# Patient Record
Sex: MALE | Race: WHITE | NOT HISPANIC OR LATINO | Employment: OTHER | URBAN - METROPOLITAN AREA
[De-identification: names, ages, dates, MRNs, and addresses within clinical notes are randomized per-mention and may not be internally consistent; named-entity substitution may affect disease eponyms.]

---

## 2017-02-01 NOTE — TELEPHONE ENCOUNTER
----- Message from Sandhya Valencia sent at 2/1/2017 10:04 AM EST -----  Contact: [phone]  PT SAID HIS INSURANCE NEEDS US TO REQUEST HIS NEXIUM. PLEASE CALL  Patient has tried Pantoprazole,Zantac Pepcid AC // didn't help and otc Nexium used 2 pills with relief so RX strength needed

## 2017-07-31 ENCOUNTER — TELEPHONE (OUTPATIENT)
Dept: FAMILY MEDICINE CLINIC | Facility: CLINIC | Age: 62
End: 2017-07-31

## 2017-07-31 ENCOUNTER — OFFICE VISIT (OUTPATIENT)
Dept: FAMILY MEDICINE CLINIC | Facility: CLINIC | Age: 62
End: 2017-07-31

## 2017-07-31 VITALS
TEMPERATURE: 98.5 F | DIASTOLIC BLOOD PRESSURE: 70 MMHG | WEIGHT: 228.4 LBS | HEIGHT: 75 IN | SYSTOLIC BLOOD PRESSURE: 130 MMHG | OXYGEN SATURATION: 94 % | RESPIRATION RATE: 16 BRPM | BODY MASS INDEX: 28.4 KG/M2 | HEART RATE: 69 BPM

## 2017-07-31 DIAGNOSIS — J06.9 ACUTE URI: Primary | ICD-10-CM

## 2017-07-31 DIAGNOSIS — Z12.11 ENCOUNTER FOR SCREENING COLONOSCOPY: ICD-10-CM

## 2017-07-31 DIAGNOSIS — J40 BRONCHITIS: ICD-10-CM

## 2017-07-31 PROCEDURE — 99213 OFFICE O/P EST LOW 20 MIN: CPT | Performed by: NURSE PRACTITIONER

## 2017-07-31 RX ORDER — ALBUTEROL SULFATE 90 UG/1
2 AEROSOL, METERED RESPIRATORY (INHALATION) EVERY 4 HOURS PRN
Qty: 1 INHALER | Refills: 1 | COMMUNITY
End: 2017-08-16 | Stop reason: SDUPTHER

## 2017-07-31 RX ORDER — LEVOFLOXACIN 500 MG/1
500 TABLET, FILM COATED ORAL DAILY
Qty: 7 TABLET | Refills: 0 | Status: SHIPPED | OUTPATIENT
Start: 2017-07-31 | End: 2017-08-16

## 2017-07-31 NOTE — PATIENT INSTRUCTIONS
Start levaquin 500mg daily for 7 days, take with food, previously tolerated.  Albuterol inhaler 2 puffs every 4 hours as needed for wheezing or SOA.  May try mucinex DM OTC as needed for cough and congestion.  Sleep propped up at night.   Increase fluid intake, get plenty of rest.   Call office and f/u if no improvement in about 5-7 days.   Patient agrees with plan of care and understands instructions. Call if worsening symptoms or any problems or concerns.

## 2017-07-31 NOTE — PROGRESS NOTES
Subjective   Juventino Rueda is a 62 y.o. male.     History of Present Illness   C/o cough and congestion, he has a productive cough, yellow in color, HA, chills, he has noticed wheezing, he denies fever, symptoms started about 1 week ago, he does not smoke, he does have hx of allergies and asthma, he tried inhalers but do not help much. He does have chest tightness at times, he tried robitussin DM OTC. He also tried evangelista seltzer OTC. He tried whiskey honey and lemon. He is traveling out of town this weekend.   The following portions of the patient's history were reviewed and updated as appropriate: allergies, current medications, past family history, past medical history, past social history, past surgical history and problem list.    Review of Systems   Constitutional: Positive for chills. Negative for diaphoresis and fever.   HENT: Positive for congestion. Negative for ear pain, rhinorrhea, sinus pressure and sore throat.    Eyes: Negative for pain.   Respiratory: Positive for cough, chest tightness and wheezing. Negative for shortness of breath.    Cardiovascular: Negative for chest pain.   Musculoskeletal: Negative for arthralgias and myalgias.   Skin: Negative for pallor.   Allergic/Immunologic: Positive for environmental allergies.   Neurological: Negative for dizziness, light-headedness and headaches.   All other systems reviewed and are negative.      Objective   Physical Exam   Constitutional: He is oriented to person, place, and time. He appears well-developed and well-nourished.   HENT:   Head: Normocephalic.   Right Ear: Tympanic membrane and ear canal normal.   Left Ear: Tympanic membrane and ear canal normal.   Nose: Nose normal.   Mouth/Throat: Uvula is midline, oropharynx is clear and moist and mucous membranes are normal.   Eyes: Pupils are equal, round, and reactive to light.   Neck: Normal range of motion. Neck supple.   Cardiovascular: Normal rate, regular rhythm and normal heart sounds.     Pulmonary/Chest: Effort normal. No respiratory distress. He has no decreased breath sounds. He has no wheezes. He has rhonchi in the right lower field and the left lower field.   Musculoskeletal: Normal range of motion.   Lymphadenopathy:     He has no cervical adenopathy.   Neurological: He is alert and oriented to person, place, and time.   Skin: Skin is warm and dry.   Psychiatric: He has a normal mood and affect. His behavior is normal. Judgment and thought content normal.   Nursing note and vitals reviewed.      Assessment/Plan   Juventino was seen today for uri.    Diagnoses and all orders for this visit:    Acute URI    Bronchitis    Encounter for screening colonoscopy  -     Ambulatory Referral For Screening Colonoscopy    Other orders  -     levoFLOXacin (LEVAQUIN) 500 MG tablet; Take 1 tablet by mouth Daily. 1 tab qd x 10d          Start levaquin 500mg daily for 7 days, take with food, previously tolerated.  Albuterol inhaler 2 puffs every 4 hours as needed for wheezing or SOA.  May try mucinex DM OTC as needed for cough and congestion.  Sleep propped up at night.   Increase fluid intake, get plenty of rest.   Call office and f/u if no improvement in about 5-7 days.   Patient agrees with plan of care and understands instructions. Call if worsening symptoms or any problems or concerns.

## 2017-08-16 ENCOUNTER — TELEPHONE (OUTPATIENT)
Dept: FAMILY MEDICINE CLINIC | Facility: CLINIC | Age: 62
End: 2017-08-16

## 2017-08-16 ENCOUNTER — OFFICE VISIT (OUTPATIENT)
Dept: FAMILY MEDICINE CLINIC | Facility: CLINIC | Age: 62
End: 2017-08-16

## 2017-08-16 VITALS
TEMPERATURE: 98.5 F | HEART RATE: 77 BPM | OXYGEN SATURATION: 91 % | WEIGHT: 231.8 LBS | HEIGHT: 75 IN | DIASTOLIC BLOOD PRESSURE: 70 MMHG | RESPIRATION RATE: 16 BRPM | SYSTOLIC BLOOD PRESSURE: 130 MMHG | BODY MASS INDEX: 28.82 KG/M2

## 2017-08-16 DIAGNOSIS — M81.0 OSTEOPOROSIS: ICD-10-CM

## 2017-08-16 DIAGNOSIS — D64.9 LOW HEMOGLOBIN: ICD-10-CM

## 2017-08-16 DIAGNOSIS — N52.9 ERECTILE DYSFUNCTION, UNSPECIFIED ERECTILE DYSFUNCTION TYPE: ICD-10-CM

## 2017-08-16 DIAGNOSIS — R73.9 ELEVATED BLOOD SUGAR: ICD-10-CM

## 2017-08-16 DIAGNOSIS — Z87.39 HISTORY OF OSTEOPOROSIS: ICD-10-CM

## 2017-08-16 DIAGNOSIS — R93.89 ABNORMAL CHEST X-RAY: ICD-10-CM

## 2017-08-16 DIAGNOSIS — R73.9 ELEVATED BLOOD SUGAR: Primary | ICD-10-CM

## 2017-08-16 DIAGNOSIS — R05.3 PERSISTENT COUGH FOR 3 WEEKS OR LONGER: Primary | ICD-10-CM

## 2017-08-16 DIAGNOSIS — J98.6 PARALYSIS OF DIAPHRAGM: ICD-10-CM

## 2017-08-16 DIAGNOSIS — Z00.00 MEDICARE ANNUAL WELLNESS VISIT, INITIAL: ICD-10-CM

## 2017-08-16 LAB
ALBUMIN SERPL-MCNC: 4.2 G/DL (ref 3.5–5.2)
ALBUMIN/GLOB SERPL: 1.4 G/DL
ALP SERPL-CCNC: 79 U/L (ref 39–117)
ALT SERPL W P-5'-P-CCNC: 19 U/L (ref 1–41)
ANION GAP SERPL CALCULATED.3IONS-SCNC: 9.7 MMOL/L
AST SERPL-CCNC: 20 U/L (ref 1–40)
BILIRUB SERPL-MCNC: 0.5 MG/DL (ref 0.1–1.2)
BUN BLD-MCNC: 9 MG/DL (ref 8–23)
BUN/CREAT SERPL: 13.8 (ref 7–25)
CALCIUM SPEC-SCNC: 9.6 MG/DL (ref 8.6–10.5)
CHLORIDE SERPL-SCNC: 98 MMOL/L (ref 98–107)
CO2 SERPL-SCNC: 33.3 MMOL/L (ref 22–29)
CREAT BLD-MCNC: 0.65 MG/DL (ref 0.76–1.27)
ERYTHROCYTE [DISTWIDTH] IN BLOOD BY AUTOMATED COUNT: 13.3 % (ref 4.5–15)
GFR SERPL CREATININE-BSD FRML MDRD: 125 ML/MIN/1.73
GLOBULIN UR ELPH-MCNC: 3.1 GM/DL
GLUCOSE BLD-MCNC: 158 MG/DL (ref 65–99)
HBA1C MFR BLD: 7.1 % (ref 4.8–5.6)
HCT VFR BLD AUTO: 39.9 % (ref 35–60)
HGB BLD-MCNC: 12.3 G/DL (ref 13.5–18)
LYMPHOCYTES # BLD AUTO: 1.2 10*3/MM3 (ref 1.2–3.4)
LYMPHOCYTES NFR BLD AUTO: 34.5 % (ref 21–51)
MCH RBC QN AUTO: 28.2 PG (ref 26.1–33.1)
MCHC RBC AUTO-ENTMCNC: 30.9 G/DL (ref 33–37)
MCV RBC AUTO: 91.5 FL (ref 80–99)
MONOCYTES # BLD AUTO: 0.3 10*3/MM3 (ref 0.1–0.6)
MONOCYTES NFR BLD AUTO: 8.2 % (ref 2–9)
NEUTROPHILS # BLD AUTO: 1.9 10*3/MM3 (ref 1.4–6.5)
NEUTROPHILS NFR BLD AUTO: 57.3 % (ref 42–75)
PLATELET # BLD AUTO: 172 10*3/MM3 (ref 150–450)
PMV BLD AUTO: 6.4 FL (ref 7.1–10.5)
POTASSIUM BLD-SCNC: 4.9 MMOL/L (ref 3.5–5.2)
PROT SERPL-MCNC: 7.3 G/DL (ref 6–8.5)
RBC # BLD AUTO: 4.36 10*6/MM3 (ref 4–6)
SODIUM BLD-SCNC: 141 MMOL/L (ref 136–145)
WBC NRBC COR # BLD: 3.4 10*3/MM3 (ref 4.5–10)

## 2017-08-16 PROCEDURE — 36415 COLL VENOUS BLD VENIPUNCTURE: CPT | Performed by: NURSE PRACTITIONER

## 2017-08-16 PROCEDURE — 83036 HEMOGLOBIN GLYCOSYLATED A1C: CPT | Performed by: NURSE PRACTITIONER

## 2017-08-16 PROCEDURE — 99214 OFFICE O/P EST MOD 30 MIN: CPT | Performed by: NURSE PRACTITIONER

## 2017-08-16 PROCEDURE — G0438 PPPS, INITIAL VISIT: HCPCS | Performed by: NURSE PRACTITIONER

## 2017-08-16 PROCEDURE — 80053 COMPREHEN METABOLIC PANEL: CPT | Performed by: NURSE PRACTITIONER

## 2017-08-16 PROCEDURE — 85025 COMPLETE CBC W/AUTO DIFF WBC: CPT | Performed by: NURSE PRACTITIONER

## 2017-08-16 PROCEDURE — 71020 XR CHEST 2 VW: CPT | Performed by: NURSE PRACTITIONER

## 2017-08-16 RX ORDER — ALBUTEROL SULFATE 90 UG/1
2 AEROSOL, METERED RESPIRATORY (INHALATION) EVERY 4 HOURS PRN
Qty: 1 INHALER | Refills: 1 | Status: SHIPPED | OUTPATIENT
Start: 2017-08-16 | End: 2017-10-24 | Stop reason: SDUPTHER

## 2017-08-16 RX ORDER — CEFUROXIME AXETIL 250 MG/1
250 TABLET ORAL 2 TIMES DAILY
Qty: 20 TABLET | Refills: 0 | Status: SHIPPED | OUTPATIENT
Start: 2017-08-16 | End: 2017-10-11

## 2017-08-16 RX ORDER — CEFUROXIME AXETIL 250 MG/1
250 TABLET ORAL 2 TIMES DAILY
Qty: 20 TABLET | Refills: 0 | COMMUNITY
End: 2017-08-16 | Stop reason: SDUPTHER

## 2017-08-16 NOTE — TELEPHONE ENCOUNTER
----- Message from CITLALLI Thomas sent at 8/16/2017  4:02 PM EDT -----  Please call patient with results. bs slightly elevated, will order hemoglobin a1c, kidney func slightly decreased, increase water intake to 8 glasses per day.    Pt informed of lab results

## 2017-08-16 NOTE — PATIENT INSTRUCTIONS
Start ceftin 250mg 2x day for 10 days, take with food, previously tolerated.  CBC,CMP today, will call with results.   Heme occult card, patient to return to office.   Albuterol inhaler 2 puffs every 4 hours as needed for wheezing or SOA, previously tolerated.  cxr today, will call with results.  dexa scan, will call with appt.  Chest ct will call with appt.  Discussed plan of care with Dr. Salamanca.  Increase fluid intake, get plenty of rest.   Patient agrees with plan of care and understands instructions. Call if worsening symptoms or any problems or concerns.     Osteoporosis  Osteoporosis is the thinning and loss of density in the bones. Osteoporosis makes the bones more brittle, fragile, and likely to break (fracture). Over time, osteoporosis can cause the bones to become so weak that they fracture after a simple fall. The bones most likely to fracture are the bones in the hip, wrist, and spine.  CAUSES   The exact cause is not known.  RISK FACTORS  Anyone can develop osteoporosis. You may be at greater risk if you have a family history of the condition or have poor nutrition. You may also have a higher risk if you are:   · Female.    · 50 years old or older.  · A smoker.  · Not physically active.    · White or .  · Slender.  SIGNS AND SYMPTOMS   A fracture might be the first sign of the disease, especially if it results from a fall or injury that would not usually cause a bone to break. Other signs and symptoms include:   · Low back and neck pain.  · Stooped posture.  · Height loss.  DIAGNOSIS   To make a diagnosis, your health care provider may:  · Take a medical history.  · Perform a physical exam.  · Order tests, such as:    A bone mineral density test.    A dual-energy X-ray absorptiometry test.  TREATMENT   The goal of osteoporosis treatment is to strengthen your bones to reduce your risk of a fracture. Treatment may involve:  · Making lifestyle changes, such as:    Eating a diet rich in calcium.     Doing weight-bearing and muscle-strengthening exercises.    Stopping tobacco use.    Limiting alcohol intake.  · Taking medicine to slow the process of bone loss or to increase bone density.  · Monitoring your levels of calcium and vitamin D.  HOME CARE INSTRUCTIONS  · Include calcium and vitamin D in your diet. Calcium is important for bone health, and vitamin D helps the body absorb calcium.  · Perform weight-bearing and muscle-strengthening exercises as directed by your health care provider.  · Do not use any tobacco products, including cigarettes, chewing tobacco, and electronic cigarettes. If you need help quitting, ask your health care provider.  · Limit your alcohol intake.  · Take medicines only as directed by your health care provider.  · Keep all follow-up visits as directed by your health care provider. This is important.  · Take precautions at home to lower your risk of falling, such as:    Keeping rooms well lit and clutter free.    Installing safety rails on stairs.    Using rubber mats in the bathroom and other areas that are often wet or slippery.  SEEK IMMEDIATE MEDICAL CARE IF:   You fall or injure yourself.      This information is not intended to replace advice given to you by your health care provider. Make sure you discuss any questions you have with your health care provider.     Document Released: 09/27/2006 Document Revised: 04/10/2017 Document Reviewed: 05/28/2015  [employer] Interactive Patient Education ©2017 [employer]

## 2017-08-16 NOTE — PROGRESS NOTES
Subjective   Juventino Rueda is a 62 y.o. male.     History of Present Illness   C/o cough, congestion, HA, he was seen on 7/31/17, given levaquin, inhalers, he states he got slightly better, but the cough won't go away, he states if he expends any effort he starts coughing, he coughs so much his head hurts, he denies smoking hx, he has a hx of asthma and allergies, he has a hx of left paralyzed diaphragm, diaphragmatic hernia repair as a child, he does not see pulm. He did not get inhaler that was prescribed at last vist, he denies fever. He has had trouble sleeping, he has a productive cough, yellow or clear in color, he has tried mucinex. He has had coke to drink this morning. He sees Dr. Russo, [facility], for leg wound on left leg, he states he does noticed swelling in legs at times. He has taken singular in the past for this. He states he smoke a pipe or cigar once a year when he goes fishing, no other smoking history.   He has a hx of osteoporosis, he has not had a recent done density scan, he was on medication for this, also takes calcium, thinks last dexa about 5 years ago, was on injectable medication until about 2 years ago. He states he had this in multiple areas of body.   He also c/o ED, he has tried cilias and Stendra which help, he states he cannot achieve erection when he tried to have intercourse with his wife, he does not tolerate Viagra, he has not seen urology.     The following portions of the patient's history were reviewed and updated as appropriate: allergies, current medications, past family history, past medical history, past social history, past surgical history and problem list.    Review of Systems   Constitutional: Negative for chills, diaphoresis and fever.   HENT: Positive for congestion. Negative for ear pain, postnasal drip, rhinorrhea, sinus pressure and sore throat.    Eyes: Negative for pain.   Respiratory: Positive for cough, shortness of breath and wheezing. Negative for chest  tightness.    Cardiovascular: Negative for chest pain, palpitations and leg swelling.   Musculoskeletal: Negative for arthralgias and myalgias.   Skin: Negative for pallor.   Allergic/Immunologic: Positive for environmental allergies.   Neurological: Positive for headaches. Negative for dizziness and light-headedness.   All other systems reviewed and are negative.      Objective   Physical Exam   Constitutional: He is oriented to person, place, and time. He appears well-developed and well-nourished.   HENT:   Head: Normocephalic.   Eyes: Pupils are equal, round, and reactive to light.   Neck: Normal range of motion. Neck supple.   Cardiovascular: Normal rate, regular rhythm and normal heart sounds.    Pulses:       Radial pulses are 2+ on the right side, and 2+ on the left side.        Dorsalis pedis pulses are 2+ on the right side, and 2+ on the left side.        Posterior tibial pulses are 2+ on the right side, and 2+ on the left side.   Pulmonary/Chest: Effort normal. No respiratory distress. He has decreased breath sounds in the left upper field and the left lower field. He has no wheezes. He has no rales. He exhibits no tenderness.   Musculoskeletal: Normal range of motion.   Lymphadenopathy:     He has no cervical adenopathy.   Neurological: He is alert and oriented to person, place, and time.   Skin: Skin is warm and dry.   Psychiatric: He has a normal mood and affect. His behavior is normal. Judgment and thought content normal.   Nursing note and vitals reviewed.  cxr 2v in office for persistent cough, no comparison noted, showed elevated left hemidiaphragm, awaiting radiology over read.     Assessment/Plan   Juventino was seen today for follow-up.    Diagnoses and all orders for this visit:    Persistent cough for 3 weeks or longer  -     XR Chest 2 View  -     CBC & Differential  -     Comprehensive Metabolic Panel  -     CBC Auto Differential  -     CT Chest With & Without Contrast; Future    Low  hemoglobin  -     Occult Blood, Fecal By Immunoassay; Future    History of osteoporosis  -     DEXA Bone Density Axial; Future    Abnormal chest x-ray  -     CT Chest With & Without Contrast; Future    Paralysis of diaphragm  -     CT Chest With & Without Contrast; Future    Osteoporosis   -     DEXA Bone Density Axial; Future    Medicare annual wellness visit, initial    Erectile dysfunction, unspecified erectile dysfunction type  -     Ambulatory Referral to Urology    Other orders  -     albuterol (PROVENTIL HFA;VENTOLIN HFA) 108 (90 Base) MCG/ACT inhaler; Inhale 2 puffs Every 4 (Four) Hours As Needed for Wheezing or Shortness of Air.      Start ceftin 250mg 2x day for 10 days, take with food, previously tolerated.  CBC,CMP today, will call with results.   Heme occult card, patient to return to office.   Albuterol inhaler 2 puffs every 4 hours as needed for wheezing or SOA, previously tolerated.  cxr today, will call with results.  dexa scan, will call with appt.  Chest ct will call with appt. Pending results will consider pulm refer.   Sample of stendra given to patient, refer to urology, will call with appt.   Discussed plan of care with Dr. Salamanca.  Increase fluid intake, get plenty of rest.   Patient agrees with plan of care and understands instructions. Call if worsening symptoms or any problems or concerns.

## 2017-08-16 NOTE — PROGRESS NOTES
QUICK REFERENCE INFORMATION:  The ABCs of the Annual Wellness Visit    Initial Medicare Wellness Visit    HEALTH RISK ASSESSMENT    1955    Recent Hospitalizations:  No hospitalization(s) within the last year..        Current Medical Providers:  Patient Care Team:  Geoff Salamanca MD as PCP - General  R Quan Smith MD as Surgeon (Orthopedic Surgery)  Bam Lopez MD as Consulting Physician (Pain Medicine)  Aly Ortiz MD as Consulting Physician (Gastroenterology)        Smoking Status:  History   Smoking Status   • Never Smoker   Smokeless Tobacco   • Never Used       Alcohol Consumption:  History   Alcohol Use   • Yes     Comment: occas       Depression Screen:   PHQ-9 Depression Screening 8/16/2017   Little interest or pleasure in doing things 0   Feeling down, depressed, or hopeless 0   Trouble falling or staying asleep, or sleeping too much 0   Feeling tired or having little energy 1   Poor appetite or overeating 1   Feeling bad about yourself - or that you are a failure or have let yourself or your family down 0   Trouble concentrating on things, such as reading the newspaper or watching television 0   Moving or speaking so slowly that other people could have noticed. Or the opposite - being so fidgety or restless that you have been moving around a lot more than usual 0   Thoughts that you would be better off dead, or of hurting yourself in some way 0   PHQ-9 Total Score 2   If you checked off any problems, how difficult have these problems made it for you to do your work, take care of things at home, or get along with other people? Not difficult at all       Health Habits and Functional and Cognitive Screening:  Functional & Cognitive Status 8/16/2017   Do you have difficulty preparing food and eating? No   Do you have difficulty bathing yourself? No   Do you have difficulty getting dressed? No   Do you have difficulty using the toilet? No   Do you have difficulty moving around from place to  place? No   In the past year have you fallen or experienced a near fall? Yes   Do you need help using the phone?  No   Are you deaf or do you have serious difficulty hearing?  No   Do you need help with transportation? No   Do you need help shopping? No   Do you need help preparing meals?  No   Do you need help with housework?  No   Do you need help with laundry? No   Do you need help taking your medications? No   Do you need help managing money? No   Do you have difficulty concentrating, remembering or making decisions? No       Health Habits  Current Diet: Limited Junk Food  Dental Exam: Not up to date  Eye Exam: Not up to date  Exercise (times per week): 4 times per week  Current Exercise Activities Include: Yard Work          Does the patient have evidence of cognitive impairment? No    Asiprin use counseling: Does not need ASA (and currently is not on it)      Recent Lab Results:    Visual Acuity:  No exam data present    Age-appropriate Screening Schedule:  Refer to the list below for future screening recommendations based on patient's age, sex and/or medical conditions. Orders for these recommended tests are listed in the plan section. The patient has been provided with a written plan.    Health Maintenance   Topic Date Due   • TDAP/TD VACCINES (1 - Tdap) 04/11/1975   • COLONOSCOPY  04/05/2016   • ZOSTER VACCINE  04/11/2016   • DXA SCAN  08/16/2017   • INFLUENZA VACCINE  09/01/2017        Subjective   History of Present Illness    Juventino Rueda is a 62 y.o. male who presents for an Annual Wellness Visit.    The following portions of the patient's history were reviewed and updated as appropriate: allergies, current medications, past family history, past medical history, past social history, past surgical history and problem list.    Outpatient Medications Prior to Visit   Medication Sig Dispense Refill   • baclofen (LIORESAL) 10 MG tablet Take 10 mg by mouth 3 (three) times a day.     • calcium carbonate  "(OS-JOE) 600 MG tablet Take 600 mg by mouth daily.     • gabapentin (NEURONTIN) 300 MG capsule Take 300 mg by mouth 2 (two) times a day.     • HYDROcodone-acetaminophen (NORCO)  MG per tablet      • NEXIUM 40 MG capsule TAKE ONE CAPSULE BY MOUTH IN THE MORNING BEFORE  BREAKFAST 30 capsule 0   • pantoprazole (PROTONIX) 40 MG EC tablet TAKE ONE TABLET BY MOUTH ONCE DAILY 30 tablet 0   • VOLTAREN 1 % gel gel 3 (three) times a day as needed.     • albuterol (PROVENTIL HFA;VENTOLIN HFA) 108 (90 BASE) MCG/ACT inhaler Inhale 2 puffs Every 4 (Four) Hours As Needed for Wheezing. 1 inhaler 1   • levoFLOXacin (LEVAQUIN) 500 MG tablet Take 1 tablet by mouth Daily. 1 tab qd x 10d 7 tablet 0     No facility-administered medications prior to visit.        Patient Active Problem List   Diagnosis   • GERD (gastroesophageal reflux disease)   • Arthritis       Advance Care Planning:  has NO advance directive - information provided to the patient today    Identification of Risk Factors:  Risk factors include: weight , unhealthy diet and inactivity.    Review of Systems    Compared to one year ago, the patient feels his physical health is the same.  Compared to one year ago, the patient feels his mental health is the same.    Objective     Physical Exam    Vitals:    08/16/17 0936   BP: 130/70   BP Location: Left arm   Patient Position: Sitting   Cuff Size: Adult   Pulse: 77   Resp: 16   Temp: 98.5 °F (36.9 °C)   TempSrc: Oral   SpO2: 91%   Weight: 231 lb 12.8 oz (105 kg)   Height: 75\" (190.5 cm)   PainSc: 0-No pain       Body mass index is 28.97 kg/(m^2).  Discussed the patient's BMI with him. The BMI is in the acceptable range.    Assessment/Plan   Patient Self-Management and Personalized Health Advice  The patient has been provided with information about: diet, exercise and weight management and preventive services including:   · Advance directive, Bone densitometry screening.    Visit Diagnoses:    ICD-10-CM ICD-9-CM   1. " Persistent cough for 3 weeks or longer R05 786.2   2. Low hemoglobin D64.9 285.9   3. History of osteoporosis Z87.39 V13.59   4. Abnormal chest x-ray R93.8 793.2   5. Paralysis of diaphragm J98.6 519.4   6. Osteoporosis  M81.0 733.00       Orders Placed This Encounter   Procedures   • XR Chest 2 View     Order Specific Question:   Reason for Exam:     Answer:   persistent cough   • DEXA Bone Density Axial     Standing Status:   Future     Standing Expiration Date:   8/16/2018     Order Specific Question:   Reason for Exam:     Answer:   history of osteoporosis.   • CT Chest With & Without Contrast     Standing Status:   Future     Standing Expiration Date:   8/16/2018     Order Specific Question:   Reason for Exam:     Answer:   abnormal cxr, history of paralysis of diaphragm, left.   • Comprehensive Metabolic Panel   • CBC Auto Differential   • Occult Blood, Fecal By Immunoassay     Standing Status:   Future     Standing Expiration Date:   8/16/2018   • CBC & Differential     Order Specific Question:   Manual Differential     Answer:   No       Outpatient Encounter Prescriptions as of 8/16/2017   Medication Sig Dispense Refill   • albuterol (PROVENTIL HFA;VENTOLIN HFA) 108 (90 Base) MCG/ACT inhaler Inhale 2 puffs Every 4 (Four) Hours As Needed for Wheezing or Shortness of Air. 1 inhaler 1   • baclofen (LIORESAL) 10 MG tablet Take 10 mg by mouth 3 (three) times a day.     • calcium carbonate (OS-JOE) 600 MG tablet Take 600 mg by mouth daily.     • cefuroxime (CEFTIN) 250 MG tablet Take 1 tablet by mouth 2 (Two) Times a Day. X 10 d 20 tablet 0   • gabapentin (NEURONTIN) 300 MG capsule Take 300 mg by mouth 2 (two) times a day.     • HYDROcodone-acetaminophen (NORCO)  MG per tablet      • NEXIUM 40 MG capsule TAKE ONE CAPSULE BY MOUTH IN THE MORNING BEFORE  BREAKFAST 30 capsule 0   • pantoprazole (PROTONIX) 40 MG EC tablet TAKE ONE TABLET BY MOUTH ONCE DAILY 30 tablet 0   • VOLTAREN 1 % gel gel 3 (three) times a  day as needed.     • [DISCONTINUED] albuterol (PROVENTIL HFA;VENTOLIN HFA) 108 (90 BASE) MCG/ACT inhaler Inhale 2 puffs Every 4 (Four) Hours As Needed for Wheezing. 1 inhaler 1   • [DISCONTINUED] levoFLOXacin (LEVAQUIN) 500 MG tablet Take 1 tablet by mouth Daily. 1 tab qd x 10d 7 tablet 0     No facility-administered encounter medications on file as of 8/16/2017.        Reviewed use of high risk medication in the elderly: not applicable  Reviewed for potential of harmful drug interactions in the elderly: not applicable    Follow Up:  Return if symptoms worsen or fail to improve.     An After Visit Summary and PPPS with all of these plans were given to the patient.

## 2017-08-17 ENCOUNTER — TELEPHONE (OUTPATIENT)
Dept: FAMILY MEDICINE CLINIC | Facility: CLINIC | Age: 62
End: 2017-08-17

## 2017-08-17 DIAGNOSIS — R73.09 ELEVATED HEMOGLOBIN A1C: Primary | ICD-10-CM

## 2017-08-17 DIAGNOSIS — R79.89 ABNORMAL CBC: ICD-10-CM

## 2017-08-17 DIAGNOSIS — E11.8 TYPE 2 DIABETES MELLITUS WITH COMPLICATION, WITHOUT LONG-TERM CURRENT USE OF INSULIN (HCC): Primary | ICD-10-CM

## 2017-08-17 RX ORDER — LANCING DEVICE/LANCETS
1 KIT MISCELLANEOUS 4 TIMES DAILY
Qty: 1 KIT | Refills: 1 | Status: SHIPPED | OUTPATIENT
Start: 2017-08-17

## 2017-08-17 RX ORDER — BLOOD-GLUCOSE METER
1 EACH MISCELLANEOUS 4 TIMES DAILY
Qty: 1 KIT | Refills: 1 | Status: SHIPPED | OUTPATIENT
Start: 2017-08-17

## 2017-08-17 NOTE — TELEPHONE ENCOUNTER
----- Message from CITLALLI Thomas sent at 8/17/2017 12:51 PM EDT -----  Please call patient with results. His A1C is elevated which suggests diabetes. We will start medication for this as well as diet and exercise. He should begin metformin 500mg 2x day, I will also call in supplies for him to check BS at home. He needs to f/u next week for diabetes counseling and additional lab work. cxr results still pending.    Pt informed of lab results

## 2017-08-18 ENCOUNTER — TELEPHONE (OUTPATIENT)
Dept: FAMILY MEDICINE CLINIC | Facility: CLINIC | Age: 62
End: 2017-08-18

## 2017-08-18 DIAGNOSIS — E11.9 TYPE 2 DIABETES MELLITUS WITHOUT COMPLICATION, WITHOUT LONG-TERM CURRENT USE OF INSULIN (HCC): Primary | ICD-10-CM

## 2017-08-22 ENCOUNTER — TELEPHONE (OUTPATIENT)
Dept: FAMILY MEDICINE CLINIC | Facility: CLINIC | Age: 62
End: 2017-08-22

## 2017-08-22 NOTE — TELEPHONE ENCOUNTER
PT HAS A DEXA SCHEDULED FOR THE 26 BUT THE ORDER IS INVALID BECAUSE IT SAYS HE HAS A HX OF OSTEOPOROSIS AND WHEN SCHEDULING WENT TO LOOK FOR NOTES THEY COULDN'T FIND ANYTHING STATING THE PT HAS EVER HAD A DEXA SCAN BEFORE OR THAT THEY HAVE A HX OF OSTEOPOROSIS,THEY NEED A NEW ORDER     [phone]

## 2017-08-23 ENCOUNTER — HOSPITAL ENCOUNTER (OUTPATIENT)
Dept: CT IMAGING | Facility: HOSPITAL | Age: 62
Discharge: HOME | End: 2017-08-23

## 2017-08-23 ENCOUNTER — APPOINTMENT (OUTPATIENT)
Dept: BONE DENSITY | Facility: HOSPITAL | Age: 62
End: 2017-08-23

## 2017-09-05 ENCOUNTER — HOSPITAL ENCOUNTER (OUTPATIENT)
Dept: CT IMAGING | Facility: HOSPITAL | Age: 62
Discharge: HOME | End: 2017-09-05

## 2017-09-05 ENCOUNTER — HOSPITAL ENCOUNTER (OUTPATIENT)
Dept: BONE DENSITY | Facility: HOSPITAL | Age: 62
Discharge: HOME | End: 2017-09-05
Admitting: NURSE PRACTITIONER

## 2017-09-05 DIAGNOSIS — J98.6 PARALYSIS OF DIAPHRAGM: ICD-10-CM

## 2017-09-05 DIAGNOSIS — R93.89 ABNORMAL CHEST X-RAY: ICD-10-CM

## 2017-09-05 DIAGNOSIS — R05.3 PERSISTENT COUGH FOR 3 WEEKS OR LONGER: ICD-10-CM

## 2017-09-05 DIAGNOSIS — M81.0 OSTEOPOROSIS: ICD-10-CM

## 2017-09-05 DIAGNOSIS — Z87.39 HISTORY OF OSTEOPOROSIS: ICD-10-CM

## 2017-09-05 LAB — CREAT BLDA-MCNC: 0.8 MG/DL (ref 0.6–1.3)

## 2017-09-05 PROCEDURE — 82565 ASSAY OF CREATININE: CPT

## 2017-09-05 PROCEDURE — 71260 CT THORAX DX C+: CPT

## 2017-09-05 PROCEDURE — 0 IOPAMIDOL 61 % SOLUTION: Performed by: NURSE PRACTITIONER

## 2017-09-05 PROCEDURE — 77080 DXA BONE DENSITY AXIAL: CPT

## 2017-09-05 RX ADMIN — IOPAMIDOL 75 ML: 612 INJECTION, SOLUTION INTRAVENOUS at 14:21

## 2017-09-11 ENCOUNTER — DOCUMENTATION (OUTPATIENT)
Dept: FAMILY MEDICINE CLINIC | Facility: CLINIC | Age: 62
End: 2017-09-11

## 2017-09-11 DIAGNOSIS — Z12.11 SCREEN FOR COLON CANCER: ICD-10-CM

## 2017-09-11 DIAGNOSIS — K76.0 FATTY LIVER: ICD-10-CM

## 2017-09-11 DIAGNOSIS — R93.89 ABNORMAL CT SCAN: ICD-10-CM

## 2017-09-11 DIAGNOSIS — N28.89 RENAL MASS, LEFT: ICD-10-CM

## 2017-09-11 DIAGNOSIS — M85.80 OSTEOPENIA: Primary | ICD-10-CM

## 2017-09-11 RX ORDER — ALENDRONATE SODIUM 70 MG/1
TABLET ORAL
Qty: 4 TABLET | Refills: 11 | Status: SHIPPED | OUTPATIENT
Start: 2017-09-11

## 2017-09-11 NOTE — PROGRESS NOTES
Patient called with dexa and ct results. Informed of osteopenia, will restart fosamax, call with problems, recheck dexa in 1 year. Will refer to GI for colonoscopy and fatty liver. Ct chest WNL. Incidentally found renal mass, scheduled for f/u with urology for ED on 9/25/17 with Dr. Keegan Espinosa. Will schedule f/u for renal mass at that time and fax CT results.

## 2017-10-11 ENCOUNTER — OFFICE VISIT (OUTPATIENT)
Dept: FAMILY MEDICINE CLINIC | Facility: CLINIC | Age: 62
End: 2017-10-11

## 2017-10-11 VITALS
OXYGEN SATURATION: 93 % | TEMPERATURE: 98.3 F | HEART RATE: 68 BPM | SYSTOLIC BLOOD PRESSURE: 120 MMHG | DIASTOLIC BLOOD PRESSURE: 60 MMHG | HEIGHT: 75 IN | RESPIRATION RATE: 18 BRPM

## 2017-10-11 DIAGNOSIS — S81.802S WOUND OF LEFT LOWER EXTREMITY, SEQUELA: ICD-10-CM

## 2017-10-11 DIAGNOSIS — N52.9 ERECTILE DYSFUNCTION, UNSPECIFIED ERECTILE DYSFUNCTION TYPE: ICD-10-CM

## 2017-10-11 DIAGNOSIS — E11.9 TYPE 2 DIABETES MELLITUS WITHOUT COMPLICATION, WITHOUT LONG-TERM CURRENT USE OF INSULIN (HCC): Primary | ICD-10-CM

## 2017-10-11 LAB
ALBUMIN SERPL-MCNC: 3.9 G/DL (ref 3.5–5.2)
ALBUMIN/GLOB SERPL: 1.1 G/DL
ALP SERPL-CCNC: 85 U/L (ref 39–117)
ALT SERPL W P-5'-P-CCNC: 19 U/L (ref 1–41)
ANION GAP SERPL CALCULATED.3IONS-SCNC: 9.4 MMOL/L
AST SERPL-CCNC: 24 U/L (ref 1–40)
BILIRUB SERPL-MCNC: 0.5 MG/DL (ref 0.1–1.2)
BUN BLD-MCNC: 13 MG/DL (ref 8–23)
BUN/CREAT SERPL: 16.9 (ref 7–25)
CALCIUM SPEC-SCNC: 9.6 MG/DL (ref 8.6–10.5)
CHLORIDE SERPL-SCNC: 98 MMOL/L (ref 98–107)
CO2 SERPL-SCNC: 33.6 MMOL/L (ref 22–29)
CREAT BLD-MCNC: 0.77 MG/DL (ref 0.76–1.27)
GFR SERPL CREATININE-BSD FRML MDRD: 103 ML/MIN/1.73
GLOBULIN UR ELPH-MCNC: 3.5 GM/DL
GLUCOSE BLD-MCNC: 145 MG/DL (ref 65–99)
POTASSIUM BLD-SCNC: 4.7 MMOL/L (ref 3.5–5.2)
PROT SERPL-MCNC: 7.4 G/DL (ref 6–8.5)
SODIUM BLD-SCNC: 141 MMOL/L (ref 136–145)

## 2017-10-11 PROCEDURE — 80053 COMPREHEN METABOLIC PANEL: CPT | Performed by: FAMILY MEDICINE

## 2017-10-11 PROCEDURE — 99213 OFFICE O/P EST LOW 20 MIN: CPT | Performed by: FAMILY MEDICINE

## 2017-10-11 PROCEDURE — 36415 COLL VENOUS BLD VENIPUNCTURE: CPT | Performed by: FAMILY MEDICINE

## 2017-10-11 RX ORDER — TAMSULOSIN HYDROCHLORIDE 0.4 MG/1
1 CAPSULE ORAL NIGHTLY
COMMUNITY
End: 2021-08-24 | Stop reason: SDUPTHER

## 2017-10-11 RX ORDER — GLUCOSAMINE HCL 500 MG
5000 TABLET ORAL
COMMUNITY

## 2017-10-11 RX ORDER — GLIMEPIRIDE 1 MG/1
1 TABLET ORAL
Qty: 90 TABLET | Refills: 3 | Status: SHIPPED | OUTPATIENT
Start: 2017-10-11 | End: 2018-09-12 | Stop reason: SDUPTHER

## 2017-10-11 NOTE — PROGRESS NOTES
SUBJECTIVE:  The patient is [] a 62-year-old white male is here for follow-up on his diabetes.  He was started on metformin 500 twice a day in mid-August.  He's only been taking it once a day.  He doesn't take on the weekends because it gives him diarrhea.  His blood sugars are variable running between the 120s and up to 200.  He is currently being treated for a wound on his left lower extremity by wound care.    PAST MEDICAL HISTORY:  Reviewed.    REVIEW OF SYSTEMS:  Please see above; 14 point ROS otherwise negative.      OBJECTIVE: Vitals signs are reviewed and are stable.    HEENT: PERRLA.  []  Neck:  Supple.  []  Lungs:  Clear.    Heart:  Regular rate and rhythm.  []  Abdomen:   Soft, nontender.  []  Extremities:  6 x 3 cm ulcer is present lateral aspect of the distal tib-fib region of the left leg.    ASSESSMENT:    []Type 2 diabetes  Ulcerative wound left lower extremity.  ED      PLAN:  []Metformin will be discontinued.  Amaryl 1 mg daily will be started.  He will closely watch his blood sugars.  He is given a 2000-calorie diet.  I very strongly stressed compliance and warned him of the manifestations of untreated diabetes.  He has an eye doctor is going to schedule an eye exam.  He has used Viagra in the past and requests samples.  These are provided to him with instructions.  He will continue follow-up with his wound care physician.  He will let me know what his blood sugars are running over the next week.  He is advised to take the medication daily and not skip weekends.    Much of this encounter note is an electronic transcription/translation of spoken language to printed text.  The electronic translation of spoken language may permit erroneous, or at times, nonsensical words or phrases to be inadvertently transcribed.  Although I have reviewed the note for such errors, some may still exist.

## 2017-10-17 ENCOUNTER — OFFICE VISIT (OUTPATIENT)
Dept: GASTROENTEROLOGY | Facility: CLINIC | Age: 62
End: 2017-10-17

## 2017-10-17 VITALS
SYSTOLIC BLOOD PRESSURE: 146 MMHG | WEIGHT: 233 LBS | BODY MASS INDEX: 28.97 KG/M2 | HEART RATE: 75 BPM | HEIGHT: 75 IN | DIASTOLIC BLOOD PRESSURE: 92 MMHG

## 2017-10-17 DIAGNOSIS — N28.89 LEFT RENAL MASS: ICD-10-CM

## 2017-10-17 DIAGNOSIS — K75.81 NASH (NONALCOHOLIC STEATOHEPATITIS): Primary | ICD-10-CM

## 2017-10-17 PROCEDURE — 99213 OFFICE O/P EST LOW 20 MIN: CPT | Performed by: INTERNAL MEDICINE

## 2017-10-24 RX ORDER — ALBUTEROL SULFATE 90 UG/1
AEROSOL, METERED RESPIRATORY (INHALATION)
Qty: 1 INHALER | Refills: 1 | Status: SHIPPED | OUTPATIENT
Start: 2017-10-24 | End: 2017-12-26 | Stop reason: SDUPTHER

## 2017-11-14 ENCOUNTER — TELEPHONE (OUTPATIENT)
Dept: FAMILY MEDICINE CLINIC | Facility: CLINIC | Age: 62
End: 2017-11-14

## 2017-11-22 ENCOUNTER — OFFICE VISIT (OUTPATIENT)
Dept: FAMILY MEDICINE CLINIC | Facility: CLINIC | Age: 62
End: 2017-11-22

## 2017-11-22 VITALS
DIASTOLIC BLOOD PRESSURE: 72 MMHG | OXYGEN SATURATION: 90 % | SYSTOLIC BLOOD PRESSURE: 140 MMHG | HEART RATE: 80 BPM | TEMPERATURE: 97.8 F | HEIGHT: 75 IN | WEIGHT: 237 LBS | BODY MASS INDEX: 29.47 KG/M2 | RESPIRATION RATE: 18 BRPM

## 2017-11-22 DIAGNOSIS — R05.9 COUGH: ICD-10-CM

## 2017-11-22 DIAGNOSIS — K21.9 GASTROESOPHAGEAL REFLUX DISEASE WITHOUT ESOPHAGITIS: ICD-10-CM

## 2017-11-22 DIAGNOSIS — R06.2 WHEEZING: ICD-10-CM

## 2017-11-22 DIAGNOSIS — E11.9 TYPE 2 DIABETES MELLITUS WITHOUT COMPLICATION, WITHOUT LONG-TERM CURRENT USE OF INSULIN (HCC): ICD-10-CM

## 2017-11-22 DIAGNOSIS — M85.80 OSTEOPENIA: ICD-10-CM

## 2017-11-22 DIAGNOSIS — K75.81 NASH (NONALCOHOLIC STEATOHEPATITIS): Primary | ICD-10-CM

## 2017-11-22 LAB
25(OH)D3 SERPL-MCNC: 29.7 NG/ML (ref 30–100)
ALBUMIN SERPL-MCNC: 3.9 G/DL (ref 3.5–5.2)
ALBUMIN/GLOB SERPL: 1.1 G/DL
ALP SERPL-CCNC: 96 U/L (ref 39–117)
ALT SERPL W P-5'-P-CCNC: 12 U/L (ref 1–41)
ANION GAP SERPL CALCULATED.3IONS-SCNC: 9.7 MMOL/L
AST SERPL-CCNC: 17 U/L (ref 1–40)
BILIRUB SERPL-MCNC: 0.4 MG/DL (ref 0.1–1.2)
BUN BLD-MCNC: 15 MG/DL (ref 8–23)
BUN/CREAT SERPL: 20.5 (ref 7–25)
CALCIUM SPEC-SCNC: 9.8 MG/DL (ref 8.6–10.5)
CHLORIDE SERPL-SCNC: 98 MMOL/L (ref 98–107)
CHOLEST SERPL-MCNC: 166 MG/DL (ref 0–200)
CO2 SERPL-SCNC: 34.3 MMOL/L (ref 22–29)
CREAT BLD-MCNC: 0.73 MG/DL (ref 0.76–1.27)
ERYTHROCYTE [DISTWIDTH] IN BLOOD BY AUTOMATED COUNT: 13.6 % (ref 4.5–15)
GFR SERPL CREATININE-BSD FRML MDRD: 109 ML/MIN/1.73
GLOBULIN UR ELPH-MCNC: 3.7 GM/DL
GLUCOSE BLD-MCNC: 135 MG/DL (ref 65–99)
HBA1C MFR BLD: 6.22 % (ref 4.8–5.6)
HCT VFR BLD AUTO: 40.1 % (ref 35–60)
HDLC SERPL-MCNC: 48 MG/DL (ref 40–60)
HGB BLD-MCNC: 12.3 G/DL (ref 13.5–18)
LDLC SERPL CALC-MCNC: 99 MG/DL (ref 0–100)
LDLC/HDLC SERPL: 2.07 {RATIO}
LYMPHOCYTES # BLD AUTO: 1.2 10*3/MM3 (ref 1.2–3.4)
LYMPHOCYTES NFR BLD AUTO: 28 % (ref 21–51)
MCH RBC QN AUTO: 28.3 PG (ref 26.1–33.1)
MCHC RBC AUTO-ENTMCNC: 30.6 G/DL (ref 33–37)
MCV RBC AUTO: 92.4 FL (ref 80–99)
MONOCYTES # BLD AUTO: 0.1 10*3/MM3 (ref 0.1–0.6)
MONOCYTES NFR BLD AUTO: 3.3 % (ref 2–9)
NEUTROPHILS # BLD AUTO: 2.9 10*3/MM3 (ref 1.4–6.5)
NEUTROPHILS NFR BLD AUTO: 68.7 % (ref 42–75)
PLATELET # BLD AUTO: 191 10*3/MM3 (ref 150–450)
PMV BLD AUTO: 6.5 FL (ref 7.1–10.5)
POTASSIUM BLD-SCNC: 4.8 MMOL/L (ref 3.5–5.2)
PROT SERPL-MCNC: 7.6 G/DL (ref 6–8.5)
RBC # BLD AUTO: 4.35 10*6/MM3 (ref 4–6)
SODIUM BLD-SCNC: 142 MMOL/L (ref 136–145)
TRIGL SERPL-MCNC: 93 MG/DL (ref 0–150)
VLDLC SERPL-MCNC: 18.6 MG/DL (ref 5–40)
WBC NRBC COR # BLD: 4.2 10*3/MM3 (ref 4.5–10)

## 2017-11-22 PROCEDURE — 99214 OFFICE O/P EST MOD 30 MIN: CPT | Performed by: FAMILY MEDICINE

## 2017-11-22 PROCEDURE — 85025 COMPLETE CBC W/AUTO DIFF WBC: CPT | Performed by: FAMILY MEDICINE

## 2017-11-22 PROCEDURE — 80053 COMPREHEN METABOLIC PANEL: CPT | Performed by: FAMILY MEDICINE

## 2017-11-22 PROCEDURE — 82306 VITAMIN D 25 HYDROXY: CPT | Performed by: FAMILY MEDICINE

## 2017-11-22 PROCEDURE — 83036 HEMOGLOBIN GLYCOSYLATED A1C: CPT | Performed by: FAMILY MEDICINE

## 2017-11-22 PROCEDURE — 36415 COLL VENOUS BLD VENIPUNCTURE: CPT | Performed by: FAMILY MEDICINE

## 2017-11-22 PROCEDURE — 71020 XR CHEST PA AND LATERAL: CPT | Performed by: FAMILY MEDICINE

## 2017-11-22 PROCEDURE — 80061 LIPID PANEL: CPT | Performed by: FAMILY MEDICINE

## 2017-11-22 RX ORDER — LEVOFLOXACIN 500 MG/1
500 TABLET, FILM COATED ORAL DAILY
Qty: 10 TABLET | Refills: 0 | Status: SHIPPED | OUTPATIENT
Start: 2017-11-22 | End: 2018-09-12

## 2017-12-26 RX ORDER — ALBUTEROL SULFATE 90 UG/1
AEROSOL, METERED RESPIRATORY (INHALATION)
Qty: 1 INHALER | Refills: 1 | Status: SHIPPED | OUTPATIENT
Start: 2017-12-26 | End: 2018-02-28 | Stop reason: SDUPTHER

## 2018-02-28 RX ORDER — ALBUTEROL SULFATE 90 UG/1
AEROSOL, METERED RESPIRATORY (INHALATION)
Qty: 1 INHALER | Refills: 1 | Status: SHIPPED | OUTPATIENT
Start: 2018-02-28

## 2018-05-02 ENCOUNTER — OFFICE VISIT (OUTPATIENT)
Dept: FAMILY MEDICINE CLINIC | Facility: CLINIC | Age: 63
End: 2018-05-02

## 2018-05-02 VITALS
OXYGEN SATURATION: 95 % | DIASTOLIC BLOOD PRESSURE: 88 MMHG | WEIGHT: 245 LBS | BODY MASS INDEX: 30.46 KG/M2 | SYSTOLIC BLOOD PRESSURE: 148 MMHG | HEART RATE: 86 BPM | HEIGHT: 75 IN | TEMPERATURE: 98.2 F | RESPIRATION RATE: 18 BRPM

## 2018-05-02 DIAGNOSIS — J98.6 HEMIDIAPHRAGM PARALYSIS: ICD-10-CM

## 2018-05-02 DIAGNOSIS — J20.9 ACUTE BRONCHITIS, UNSPECIFIED ORGANISM: ICD-10-CM

## 2018-05-02 DIAGNOSIS — N52.9 ERECTILE DYSFUNCTION, UNSPECIFIED ERECTILE DYSFUNCTION TYPE: ICD-10-CM

## 2018-05-02 DIAGNOSIS — E11.9 TYPE 2 DIABETES MELLITUS WITHOUT COMPLICATION, WITHOUT LONG-TERM CURRENT USE OF INSULIN (HCC): Primary | ICD-10-CM

## 2018-05-02 LAB
ALBUMIN SERPL-MCNC: 3.8 G/DL (ref 3.5–5.2)
ALBUMIN UR-MCNC: 20 MG/L (ref 0–20)
ALBUMIN/GLOB SERPL: 1.2 G/DL
ALP SERPL-CCNC: 83 U/L (ref 39–117)
ALT SERPL W P-5'-P-CCNC: 13 U/L (ref 1–41)
ANION GAP SERPL CALCULATED.3IONS-SCNC: 10.6 MMOL/L
AST SERPL-CCNC: 17 U/L (ref 1–40)
BILIRUB SERPL-MCNC: 0.4 MG/DL (ref 0.1–1.2)
BUN BLD-MCNC: 15 MG/DL (ref 8–23)
BUN/CREAT SERPL: 16.7 (ref 7–25)
CALCIUM SPEC-SCNC: 9.3 MG/DL (ref 8.6–10.5)
CHLORIDE SERPL-SCNC: 100 MMOL/L (ref 98–107)
CHOLEST SERPL-MCNC: 194 MG/DL (ref 0–200)
CO2 SERPL-SCNC: 31.4 MMOL/L (ref 22–29)
CREAT BLD-MCNC: 0.9 MG/DL (ref 0.76–1.27)
GFR SERPL CREATININE-BSD FRML MDRD: 86 ML/MIN/1.73
GLOBULIN UR ELPH-MCNC: 3.3 GM/DL
GLUCOSE BLD-MCNC: 193 MG/DL (ref 65–99)
HBA1C MFR BLD: 6.7 % (ref 4.8–5.6)
HDLC SERPL-MCNC: 54 MG/DL (ref 40–60)
LDLC SERPL CALC-MCNC: 123 MG/DL (ref 0–100)
LDLC/HDLC SERPL: 2.27 {RATIO}
POTASSIUM BLD-SCNC: 4.9 MMOL/L (ref 3.5–5.2)
PROT SERPL-MCNC: 7.1 G/DL (ref 6–8.5)
SODIUM BLD-SCNC: 142 MMOL/L (ref 136–145)
TRIGL SERPL-MCNC: 87 MG/DL (ref 0–150)
VLDLC SERPL-MCNC: 17.4 MG/DL (ref 5–40)

## 2018-05-02 PROCEDURE — 82043 UR ALBUMIN QUANTITATIVE: CPT | Performed by: FAMILY MEDICINE

## 2018-05-02 PROCEDURE — 36415 COLL VENOUS BLD VENIPUNCTURE: CPT | Performed by: FAMILY MEDICINE

## 2018-05-02 PROCEDURE — 80053 COMPREHEN METABOLIC PANEL: CPT | Performed by: FAMILY MEDICINE

## 2018-05-02 PROCEDURE — 83036 HEMOGLOBIN GLYCOSYLATED A1C: CPT | Performed by: FAMILY MEDICINE

## 2018-05-02 PROCEDURE — 99214 OFFICE O/P EST MOD 30 MIN: CPT | Performed by: FAMILY MEDICINE

## 2018-05-02 PROCEDURE — 80061 LIPID PANEL: CPT | Performed by: FAMILY MEDICINE

## 2018-05-02 RX ORDER — BECAPLERMIN 0.01 %
GEL (GRAM) TOPICAL
COMMUNITY
Start: 2018-04-04 | End: 2020-01-09 | Stop reason: SDDI

## 2018-05-02 RX ORDER — AMOXICILLIN 875 MG/1
875 TABLET, COATED ORAL 2 TIMES DAILY
Qty: 20 TABLET | Refills: 0 | Status: SHIPPED | OUTPATIENT
Start: 2018-05-02 | End: 2019-11-12

## 2018-05-02 NOTE — PROGRESS NOTES
SUBJECTIVE:  The patient is a 63-year-old white male comes in for several issues.  He's had a cough for 3 days.  It's productive.  He's wheezing.  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  No fever or chills.  He is a diabetic is due labs.    PAST MEDICAL HISTORY:  Reviewed.    REVIEW OF SYSTEMS:  Please see above; 14 point ROS negative.      OBJECTIVE: Vitals signs are reviewed and are stable.    HEENT: PERRLA.   Neck:  Supple.   Lungs:  Clear.  Decreased breath sounds left base  Heart:  Regular rate and rhythm.   Abdomen:   Soft, nontender.   Extremities:  No cyanosis, clubbing or edema.     ASSESSMENT:   Asthmatic bronchitis  Type 2 diabetes  Paralysis left hemidiaphragm    PLAN: He is referred to pulmonology.  CMP fasting lipid hemoglobin A1c and urine for microalbuminuria ordered.  Amoxicillin 875 twice a day.  Use her inhaler.  Follow up on labs.  Recheck in a couple days if no better.    Dictated utilizing Dragon dictation.

## 2018-08-10 ENCOUNTER — TRANSCRIBE ORDERS (OUTPATIENT)
Dept: SLEEP MEDICINE | Facility: HOSPITAL | Age: 63
End: 2018-08-10

## 2018-08-10 DIAGNOSIS — G47.33 OBSTRUCTIVE SLEEP APNEA: ICD-10-CM

## 2018-08-10 DIAGNOSIS — G47.34 NOCTURNAL HYPOXEMIA: Primary | ICD-10-CM

## 2018-08-15 ENCOUNTER — HOSPITAL ENCOUNTER (OUTPATIENT)
Dept: SLEEP MEDICINE | Facility: HOSPITAL | Age: 63
Discharge: HOME | End: 2018-08-15
Admitting: INTERNAL MEDICINE

## 2018-08-15 DIAGNOSIS — G47.33 OBSTRUCTIVE SLEEP APNEA: ICD-10-CM

## 2018-08-15 DIAGNOSIS — G47.34 NOCTURNAL HYPOXEMIA: ICD-10-CM

## 2018-08-15 PROCEDURE — 95811 POLYSOM 6/>YRS CPAP 4/> PARM: CPT

## 2018-08-16 ENCOUNTER — TELEPHONE (OUTPATIENT)
Dept: SLEEP MEDICINE | Facility: HOSPITAL | Age: 63
End: 2018-08-16

## 2018-08-28 ENCOUNTER — TELEPHONE (OUTPATIENT)
Dept: FAMILY MEDICINE CLINIC | Facility: CLINIC | Age: 63
End: 2018-08-28

## 2018-08-28 ENCOUNTER — HOSPITAL ENCOUNTER (OUTPATIENT)
Dept: CARDIOLOGY | Facility: HOSPITAL | Age: 63
Discharge: HOME | End: 2018-08-28
Admitting: FAMILY MEDICINE

## 2018-08-28 ENCOUNTER — OFFICE VISIT (OUTPATIENT)
Dept: FAMILY MEDICINE CLINIC | Facility: CLINIC | Age: 63
End: 2018-08-28

## 2018-08-28 VITALS
WEIGHT: 242 LBS | OXYGEN SATURATION: 93 % | HEIGHT: 75 IN | TEMPERATURE: 98.2 F | DIASTOLIC BLOOD PRESSURE: 66 MMHG | BODY MASS INDEX: 30.09 KG/M2 | SYSTOLIC BLOOD PRESSURE: 146 MMHG | HEART RATE: 84 BPM

## 2018-08-28 DIAGNOSIS — M79.89 LEFT LEG SWELLING: Primary | ICD-10-CM

## 2018-08-28 DIAGNOSIS — M79.89 LEFT LEG SWELLING: ICD-10-CM

## 2018-08-28 DIAGNOSIS — L03.116 CELLULITIS OF LEFT LOWER EXTREMITY: ICD-10-CM

## 2018-08-28 LAB
BH CV LOW VAS LEFT POPLITEAL SPONT: 1
BH CV LOWER VASCULAR LEFT COMMON FEMORAL AUGMENT: NORMAL
BH CV LOWER VASCULAR LEFT COMMON FEMORAL COMPETENT: NORMAL
BH CV LOWER VASCULAR LEFT COMMON FEMORAL COMPRESS: NORMAL
BH CV LOWER VASCULAR LEFT COMMON FEMORAL PHASIC: NORMAL
BH CV LOWER VASCULAR LEFT COMMON FEMORAL SPONT: NORMAL
BH CV LOWER VASCULAR LEFT DISTAL FEMORAL COMPRESS: NORMAL
BH CV LOWER VASCULAR LEFT GASTRONEMIUS COMPRESS: NORMAL
BH CV LOWER VASCULAR LEFT GREATER SAPH AK COMPRESS: NORMAL
BH CV LOWER VASCULAR LEFT GREATER SAPH BK COMPRESS: NORMAL
BH CV LOWER VASCULAR LEFT LESSER SAPH COMPRESS: NORMAL
BH CV LOWER VASCULAR LEFT MID FEMORAL AUGMENT: NORMAL
BH CV LOWER VASCULAR LEFT MID FEMORAL COMPETENT: NORMAL
BH CV LOWER VASCULAR LEFT MID FEMORAL COMPRESS: NORMAL
BH CV LOWER VASCULAR LEFT MID FEMORAL PHASIC: NORMAL
BH CV LOWER VASCULAR LEFT MID FEMORAL SPONT: NORMAL
BH CV LOWER VASCULAR LEFT PERONEAL COMPRESS: NORMAL
BH CV LOWER VASCULAR LEFT POPLITEAL AUGMENT: NORMAL
BH CV LOWER VASCULAR LEFT POPLITEAL COMPETENT: NORMAL
BH CV LOWER VASCULAR LEFT POPLITEAL COMPRESS: NORMAL
BH CV LOWER VASCULAR LEFT POPLITEAL PHASIC: NORMAL
BH CV LOWER VASCULAR LEFT POPLITEAL SPONT: NORMAL
BH CV LOWER VASCULAR LEFT POPLITEAL THROMBUS: NORMAL
BH CV LOWER VASCULAR LEFT POSTERIOR TIBIAL COMPRESS: NORMAL
BH CV LOWER VASCULAR LEFT PROXIMAL FEMORAL COMPRESS: NORMAL
BH CV LOWER VASCULAR LEFT SAPHENOFEMORAL JUNCTION AUGMENT: NORMAL
BH CV LOWER VASCULAR LEFT SAPHENOFEMORAL JUNCTION COMPETENT: NORMAL
BH CV LOWER VASCULAR LEFT SAPHENOFEMORAL JUNCTION COMPRESS: NORMAL
BH CV LOWER VASCULAR LEFT SAPHENOFEMORAL JUNCTION PHASIC: NORMAL
BH CV LOWER VASCULAR LEFT SAPHENOFEMORAL JUNCTION SPONT: NORMAL
BH CV LOWER VASCULAR RIGHT COMMON FEMORAL AUGMENT: NORMAL
BH CV LOWER VASCULAR RIGHT COMMON FEMORAL COMPETENT: NORMAL
BH CV LOWER VASCULAR RIGHT COMMON FEMORAL COMPRESS: NORMAL
BH CV LOWER VASCULAR RIGHT COMMON FEMORAL PHASIC: NORMAL
BH CV LOWER VASCULAR RIGHT COMMON FEMORAL SPONT: NORMAL

## 2018-08-28 PROCEDURE — 99213 OFFICE O/P EST LOW 20 MIN: CPT | Performed by: FAMILY MEDICINE

## 2018-08-28 PROCEDURE — 93971 EXTREMITY STUDY: CPT

## 2018-08-28 RX ORDER — FLASH GLUCOSE SENSOR
1 KIT MISCELLANEOUS 3 TIMES DAILY
Qty: 3 EACH | Refills: 12 | Status: SHIPPED | OUTPATIENT
Start: 2018-08-28

## 2018-08-28 RX ORDER — NALOXONE HYDROCHLORIDE 4 MG/.1ML
SPRAY NASAL
COMMUNITY
Start: 2018-06-12

## 2018-08-28 RX ORDER — CEPHALEXIN 500 MG/1
CAPSULE ORAL
Qty: 40 CAPSULE | Refills: 0 | Status: SHIPPED | OUTPATIENT
Start: 2018-08-28 | End: 2019-11-12

## 2018-08-28 RX ORDER — FLASH GLUCOSE SENSOR
1 KIT MISCELLANEOUS 3 TIMES DAILY
Qty: 1 DEVICE | Refills: 0 | Status: SHIPPED | OUTPATIENT
Start: 2018-08-28 | End: 2020-01-09 | Stop reason: SDUPTHER

## 2018-08-28 NOTE — PROGRESS NOTES
SUBJECTIVE:  The patient is a 63-year-old white male who comes in with a week history of swelling and redness in his left leg.  Swelling and redness comes and goes.  When he puts his foot up it lessens.  However he has a healing wound on that extremity that is being followed by the wound care center.  He shows me a picture of what it looked like this morning.    PAST MEDICAL HISTORY:  Reviewed.    REVIEW OF SYSTEMS:  Please see above; 14 point ROS negative.      OBJECTIVE: Vitals signs are reviewed and are stable.    HEENT: PERRLA.   Neck:  Supple.   Lungs:  Clear.    Heart:  Regular rate and rhythm.   Abdomen:   Soft, nontender.   Extremities:  No cyanosis, clubbing or edema.     ASSESSMENT:      Soham left lower extremity-rule out DVT    PLAN: Stat venous Doppler ordered.  Keflex 500 4 times a day.  Keep leg elevated.  Follow-up in a couple days to let us know how the leg is doing.  If venous Doppler is positive appropriate treatment will begin today.

## 2018-08-28 NOTE — PROGRESS NOTES
LLE Venous doppler study complete. Preliminary positive for subacute  Popliteal DVT called to Dr. Salamanca. He spoke with patient and gave instructions for patient to return to office for treatment

## 2018-08-29 ENCOUNTER — TELEPHONE (OUTPATIENT)
Dept: SLEEP MEDICINE | Facility: HOSPITAL | Age: 63
End: 2018-08-29

## 2018-08-29 DIAGNOSIS — I82.432 ACUTE DEEP VEIN THROMBOSIS (DVT) OF POPLITEAL VEIN OF LEFT LOWER EXTREMITY (HCC): Primary | ICD-10-CM

## 2018-08-29 NOTE — TELEPHONE ENCOUNTER
Preston es[place]ña pt on mobile #, pt answered and informed pt to schedule appt with LPC for follow up. Pt stated he already had one scheduled. MAB

## 2018-09-12 RX ORDER — GLIMEPIRIDE 1 MG/1
TABLET ORAL
Qty: 90 TABLET | Refills: 3 | Status: SHIPPED | OUTPATIENT
Start: 2018-09-12 | End: 2019-10-07 | Stop reason: SDUPTHER

## 2018-10-04 ENCOUNTER — TELEPHONE (OUTPATIENT)
Dept: FAMILY MEDICINE CLINIC | Facility: CLINIC | Age: 63
End: 2018-10-04

## 2018-10-08 ENCOUNTER — HOSPITAL ENCOUNTER (OUTPATIENT)
Dept: CARDIOLOGY | Facility: HOSPITAL | Age: 63
Discharge: HOME | End: 2018-10-08
Admitting: FAMILY MEDICINE

## 2018-10-08 DIAGNOSIS — I82.432 ACUTE DEEP VEIN THROMBOSIS (DVT) OF POPLITEAL VEIN OF LEFT LOWER EXTREMITY (HCC): ICD-10-CM

## 2018-10-08 LAB
BH CV LOW VAS LEFT COMMON FEMORAL SPONT: 1
BH CV LOW VAS LEFT MID FEMORAL SPONT: 1
BH CV LOW VAS LEFT POPLITEAL SPONT: 1
BH CV LOWER VASCULAR LEFT COMMON FEMORAL AUGMENT: NORMAL
BH CV LOWER VASCULAR LEFT COMMON FEMORAL COMPETENT: NORMAL
BH CV LOWER VASCULAR LEFT COMMON FEMORAL COMPRESS: NORMAL
BH CV LOWER VASCULAR LEFT COMMON FEMORAL PHASIC: NORMAL
BH CV LOWER VASCULAR LEFT COMMON FEMORAL SPONT: NORMAL
BH CV LOWER VASCULAR LEFT DISTAL FEMORAL COMPRESS: NORMAL
BH CV LOWER VASCULAR LEFT GASTRONEMIUS COMPRESS: NORMAL
BH CV LOWER VASCULAR LEFT GREATER SAPH AK COMPRESS: NORMAL
BH CV LOWER VASCULAR LEFT GREATER SAPH BK COMPRESS: NORMAL
BH CV LOWER VASCULAR LEFT MID FEMORAL AUGMENT: NORMAL
BH CV LOWER VASCULAR LEFT MID FEMORAL COMPETENT: NORMAL
BH CV LOWER VASCULAR LEFT MID FEMORAL COMPRESS: NORMAL
BH CV LOWER VASCULAR LEFT MID FEMORAL PHASIC: NORMAL
BH CV LOWER VASCULAR LEFT MID FEMORAL SPONT: NORMAL
BH CV LOWER VASCULAR LEFT PERONEAL COMPRESS: NORMAL
BH CV LOWER VASCULAR LEFT POPLITEAL AUGMENT: NORMAL
BH CV LOWER VASCULAR LEFT POPLITEAL COMPETENT: NORMAL
BH CV LOWER VASCULAR LEFT POPLITEAL COMPRESS: NORMAL
BH CV LOWER VASCULAR LEFT POPLITEAL PHASIC: NORMAL
BH CV LOWER VASCULAR LEFT POPLITEAL SPONT: NORMAL
BH CV LOWER VASCULAR LEFT POPLITEAL THROMBUS: NORMAL
BH CV LOWER VASCULAR LEFT POSTERIOR TIBIAL COMPRESS: NORMAL
BH CV LOWER VASCULAR LEFT PROXIMAL FEMORAL COMPRESS: NORMAL
BH CV LOWER VASCULAR LEFT SAPHENOFEMORAL JUNCTION AUGMENT: NORMAL
BH CV LOWER VASCULAR LEFT SAPHENOFEMORAL JUNCTION COMPETENT: NORMAL
BH CV LOWER VASCULAR LEFT SAPHENOFEMORAL JUNCTION COMPRESS: NORMAL
BH CV LOWER VASCULAR LEFT SAPHENOFEMORAL JUNCTION PHASIC: NORMAL
BH CV LOWER VASCULAR LEFT SAPHENOFEMORAL JUNCTION SPONT: NORMAL
BH CV LOWER VASCULAR RIGHT COMMON FEMORAL AUGMENT: NORMAL
BH CV LOWER VASCULAR RIGHT COMMON FEMORAL COMPETENT: NORMAL
BH CV LOWER VASCULAR RIGHT COMMON FEMORAL COMPRESS: NORMAL
BH CV LOWER VASCULAR RIGHT COMMON FEMORAL PHASIC: NORMAL
BH CV LOWER VASCULAR RIGHT COMMON FEMORAL SPONT: NORMAL

## 2018-10-08 PROCEDURE — 93971 EXTREMITY STUDY: CPT

## 2018-10-18 ENCOUNTER — TELEPHONE (OUTPATIENT)
Dept: FAMILY MEDICINE CLINIC | Facility: CLINIC | Age: 63
End: 2018-10-18

## 2019-03-11 RX ORDER — RIVAROXABAN 20 MG/1
TABLET, FILM COATED ORAL
Qty: 90 TABLET | Refills: 1 | Status: SHIPPED | OUTPATIENT
Start: 2019-03-11 | End: 2019-08-25 | Stop reason: SDUPTHER

## 2019-04-18 ENCOUNTER — TELEPHONE (OUTPATIENT)
Dept: FAMILY MEDICINE CLINIC | Facility: CLINIC | Age: 64
End: 2019-04-18

## 2019-04-18 NOTE — TELEPHONE ENCOUNTER
See if on any blood pressure medicines or if there is family history of same we will want repeat blood pressure also any extenuating circumstances such as increased pain let me know if he does not have a history of hypertension these are his readings will probably initiate with nifedipine ER 30 mg with a recheck blood pressure in a couple days or so

## 2019-04-19 NOTE — TELEPHONE ENCOUNTER
Spoke with Teodoro with [facility], he is not on any bp meds, he does have a family hx of hypertension.

## 2019-04-22 ENCOUNTER — TELEPHONE (OUTPATIENT)
Dept: FAMILY MEDICINE CLINIC | Facility: CLINIC | Age: 64
End: 2019-04-22

## 2019-04-22 RX ORDER — NIFEDIPINE 30 MG/1
30 TABLET, EXTENDED RELEASE ORAL DAILY
Qty: 30 TABLET | Refills: 3 | Status: SHIPPED | OUTPATIENT
Start: 2019-04-22 | End: 2019-07-22 | Stop reason: SDUPTHER

## 2019-04-22 NOTE — TELEPHONE ENCOUNTER
Patient is on no b/p meds currently and says does not want  I wanted to give Nifedipine on Fri patien declined

## 2019-04-29 ENCOUNTER — TELEPHONE (OUTPATIENT)
Dept: FAMILY MEDICINE CLINIC | Facility: CLINIC | Age: 64
End: 2019-04-29

## 2019-04-29 NOTE — TELEPHONE ENCOUNTER
Left message for patient to call back to advise.  ----- Message from Geoff Salamanca MD sent at 4/29/2019  2:18 PM EDT -----  Stop the medication for now and see if symptoms improved.  Closely monitor blood pressure over the next 24 to 36 hours and let us know what it is running.  ----- Message -----  From: Debbi Rosenberg MA  Sent: 4/29/2019   2:15 PM  To: Geoff Salamanca MD    Since he started this new medication for b/p he has had the shakes Nifedipine 30mg daily.  B/P fluctuates from 120/ something up to 180/ something but cannot stand the shaking feeling its given him.. His b/p goes up and down hes not sure he needed anything yet but home health called us when it was up

## 2019-07-22 RX ORDER — NIFEDIPINE 30 MG/1
30 TABLET, EXTENDED RELEASE ORAL DAILY
Qty: 30 TABLET | Refills: 0 | Status: SHIPPED | OUTPATIENT
Start: 2019-07-22 | End: 2020-09-11

## 2019-08-26 RX ORDER — RIVAROXABAN 20 MG/1
TABLET, FILM COATED ORAL
Qty: 90 TABLET | Refills: 1 | Status: SHIPPED | OUTPATIENT
Start: 2019-08-26 | End: 2020-05-04

## 2019-10-07 RX ORDER — GLIMEPIRIDE 1 MG/1
TABLET ORAL
Qty: 90 TABLET | Refills: 3 | Status: SHIPPED | OUTPATIENT
Start: 2019-10-07 | End: 2020-10-01

## 2019-11-12 ENCOUNTER — OFFICE VISIT (OUTPATIENT)
Dept: FAMILY MEDICINE CLINIC | Facility: CLINIC | Age: 64
End: 2019-11-12

## 2019-11-12 VITALS
OXYGEN SATURATION: 94 % | BODY MASS INDEX: 31.7 KG/M2 | RESPIRATION RATE: 20 BRPM | HEART RATE: 76 BPM | DIASTOLIC BLOOD PRESSURE: 60 MMHG | TEMPERATURE: 98.7 F | HEIGHT: 74 IN | SYSTOLIC BLOOD PRESSURE: 130 MMHG | WEIGHT: 247 LBS

## 2019-11-12 DIAGNOSIS — Z00.00 WELLNESS EXAMINATION: ICD-10-CM

## 2019-11-12 DIAGNOSIS — D72.819 LEUKOPENIA, UNSPECIFIED TYPE: ICD-10-CM

## 2019-11-12 DIAGNOSIS — E13.621 DIABETIC ULCER OF LEFT HEEL ASSOCIATED WITH DIABETES MELLITUS OF OTHER TYPE, UNSPECIFIED ULCER STAGE (HCC): ICD-10-CM

## 2019-11-12 DIAGNOSIS — L97.429 DIABETIC ULCER OF LEFT HEEL ASSOCIATED WITH DIABETES MELLITUS OF OTHER TYPE, UNSPECIFIED ULCER STAGE (HCC): ICD-10-CM

## 2019-11-12 DIAGNOSIS — Z00.00 MEDICARE ANNUAL WELLNESS VISIT, SUBSEQUENT: Primary | ICD-10-CM

## 2019-11-12 LAB
ALBUMIN SERPL-MCNC: 4 G/DL (ref 3.5–5.2)
ALBUMIN/GLOB SERPL: 1.3 G/DL
ALP SERPL-CCNC: 81 U/L (ref 39–117)
ALT SERPL W P-5'-P-CCNC: 13 U/L (ref 1–41)
ANION GAP SERPL CALCULATED.3IONS-SCNC: 9.6 MMOL/L (ref 5–15)
AST SERPL-CCNC: 15 U/L (ref 1–40)
BILIRUB SERPL-MCNC: 0.4 MG/DL (ref 0.2–1.2)
BUN BLD-MCNC: 13 MG/DL (ref 8–23)
BUN/CREAT SERPL: 15.3 (ref 7–25)
CALCIUM SPEC-SCNC: 9.6 MG/DL (ref 8.6–10.5)
CHLORIDE SERPL-SCNC: 96 MMOL/L (ref 98–107)
CHOLEST SERPL-MCNC: 192 MG/DL (ref 0–200)
CO2 SERPL-SCNC: 35.4 MMOL/L (ref 22–29)
CREAT BLD-MCNC: 0.85 MG/DL (ref 0.76–1.27)
ERYTHROCYTE [DISTWIDTH] IN BLOOD BY AUTOMATED COUNT: 13.4 % (ref 12.3–15.4)
GFR SERPL CREATININE-BSD FRML MDRD: 91 ML/MIN/1.73
GLOBULIN UR ELPH-MCNC: 3.1 GM/DL
GLUCOSE BLD-MCNC: 169 MG/DL (ref 65–99)
HBA1C MFR BLD: 6.77 % (ref 4.8–5.6)
HCT VFR BLD AUTO: 39.4 % (ref 37.5–51)
HDLC SERPL-MCNC: 49 MG/DL (ref 40–60)
HGB BLD-MCNC: 12.4 G/DL (ref 13–17.7)
LDLC SERPL CALC-MCNC: 123 MG/DL (ref 0–100)
LDLC/HDLC SERPL: 2.51 {RATIO}
LYMPHOCYTES # BLD AUTO: 1 10*3/MM3 (ref 0.7–3.1)
LYMPHOCYTES NFR BLD AUTO: 30.7 % (ref 19.6–45.3)
MCH RBC QN AUTO: 29.2 PG (ref 26.6–33)
MCHC RBC AUTO-ENTMCNC: 31.5 G/DL (ref 31.5–35.7)
MCV RBC AUTO: 92.7 FL (ref 79–97)
MONOCYTES # BLD AUTO: 0.2 10*3/MM3 (ref 0.1–0.9)
MONOCYTES NFR BLD AUTO: 7.5 % (ref 5–12)
NEUTROPHILS # BLD AUTO: 2 10*3/MM3 (ref 1.7–7)
NEUTROPHILS NFR BLD AUTO: 61.8 % (ref 42.7–76)
PLATELET # BLD AUTO: 181 10*3/MM3 (ref 140–450)
PMV BLD AUTO: 5.6 FL (ref 6–12)
POTASSIUM BLD-SCNC: 4.6 MMOL/L (ref 3.5–5.2)
PROT SERPL-MCNC: 7.1 G/DL (ref 6–8.5)
RBC # BLD AUTO: 4.25 10*6/MM3 (ref 4.14–5.8)
SODIUM BLD-SCNC: 141 MMOL/L (ref 136–145)
T-UPTAKE NFR SERPL: 1.09 TBI (ref 0.8–1.3)
T4 SERPL-MCNC: 7.1 MCG/DL (ref 4.5–11.7)
TRIGL SERPL-MCNC: 99 MG/DL (ref 0–150)
TSH SERPL DL<=0.05 MIU/L-ACNC: 0.83 UIU/ML (ref 0.27–4.2)
VLDLC SERPL-MCNC: 19.8 MG/DL (ref 5–40)
WBC NRBC COR # BLD: 3.2 10*3/MM3 (ref 3.4–10.8)

## 2019-11-12 PROCEDURE — 83036 HEMOGLOBIN GLYCOSYLATED A1C: CPT | Performed by: FAMILY MEDICINE

## 2019-11-12 PROCEDURE — 84436 ASSAY OF TOTAL THYROXINE: CPT | Performed by: FAMILY MEDICINE

## 2019-11-12 PROCEDURE — 80053 COMPREHEN METABOLIC PANEL: CPT | Performed by: FAMILY MEDICINE

## 2019-11-12 PROCEDURE — 36415 COLL VENOUS BLD VENIPUNCTURE: CPT | Performed by: FAMILY MEDICINE

## 2019-11-12 PROCEDURE — G0439 PPPS, SUBSEQ VISIT: HCPCS | Performed by: FAMILY MEDICINE

## 2019-11-12 PROCEDURE — 99213 OFFICE O/P EST LOW 20 MIN: CPT | Performed by: FAMILY MEDICINE

## 2019-11-12 PROCEDURE — 80061 LIPID PANEL: CPT | Performed by: FAMILY MEDICINE

## 2019-11-12 PROCEDURE — 84443 ASSAY THYROID STIM HORMONE: CPT | Performed by: FAMILY MEDICINE

## 2019-11-12 PROCEDURE — 84479 ASSAY OF THYROID (T3 OR T4): CPT | Performed by: FAMILY MEDICINE

## 2019-11-12 PROCEDURE — 85025 COMPLETE CBC W/AUTO DIFF WBC: CPT | Performed by: FAMILY MEDICINE

## 2019-11-12 RX ORDER — MOMETASONE FUROATE 1 MG/G
CREAM TOPICAL DAILY
Qty: 15 G | Refills: 1 | Status: SHIPPED | OUTPATIENT
Start: 2019-11-12 | End: 2020-08-22

## 2019-11-12 NOTE — PROGRESS NOTES
The ABCs of the Annual Wellness Visit  Subsequent Medicare Wellness Visit    Chief Complaint   Patient presents with   • Diabetes     check up   • ulcer on foot     left/ sees wound care this am/ long term not new   • discuss meds     stopped Nifedipine about 1 week after ordered he states it made him feel funny       Subjective   History of Present Illness:  Juventino Rueda is a 64 y.o. male who presents for a Subsequent Medicare Wellness Visit.    HEALTH RISK ASSESSMENT    Recent Hospitalizations:  Recently treated at the following:  Other: [facility]    Current Medical Providers:  Patient Care Team:  Geoff Salamanca MD as PCP - General  Bam Lopez MD as Consulting Physician (Pain Medicine)  Aly Ortiz MD as Consulting Physician (Gastroenterology)  Shaheed Love MD as Surgeon (General Surgery)  Dheeraj Moeller MD as Consulting Physician (Urology)  Justo Barnes MD as Consulting Physician (Pulmonary Disease)    Smoking Status:  Social History     Tobacco Use   Smoking Status Never Smoker   Smokeless Tobacco Never Used       Alcohol Consumption:  Social History     Substance and Sexual Activity   Alcohol Use Yes    Comment: occas       Depression Screen:   PHQ-2/PHQ-9 Depression Screening 11/12/2019   Little interest or pleasure in doing things 0   Feeling down, depressed, or hopeless 0   Trouble falling or staying asleep, or sleeping too much 0   Feeling tired or having little energy 0   Poor appetite or overeating 0   Feeling bad about yourself - or that you are a failure or have let yourself or your family down 0   Trouble concentrating on things, such as reading the newspaper or watching television 0   Moving or speaking so slowly that other people could have noticed. Or the opposite - being so fidgety or restless that you have been moving around a lot more than usual 0   Thoughts that you would be better off dead, or of hurting yourself in some way 0   Total Score 0   If you checked off  any problems, how difficult have these problems made it for you to do your work, take care of things at home, or get along with other people? Not difficult at all       Fall Risk Screen:  SHYANNE Fall Risk Assessment has not been completed.    Health Habits and Functional and Cognitive Screening:  Functional & Cognitive Status 11/12/2019   Do you have difficulty preparing food and eating? No   Do you have difficulty bathing yourself, getting dressed or grooming yourself? No   Do you have difficulty using the toilet? No   Do you have difficulty moving around from place to place? No   Do you have trouble with steps or getting out of a bed or a chair? No   Current Diet Well Balanced Diet   Dental Exam Up to date   Eye Exam Up to date   Exercise (times per week) 1 times per week   Current Exercise Activities Include Yard Work   Do you need help using the phone?  No   Are you deaf or do you have serious difficulty hearing?  No   Do you need help with transportation? No   Do you need help shopping? No   Do you need help preparing meals?  No   Do you need help with housework?  No   Do you need help with laundry? No   Do you need help taking your medications? No   Do you need help managing money? No   Do you ever drive or ride in a car without wearing a seat belt? No   Have you felt unusual stress, anger or loneliness in the last month? No   Who do you live with? Spouse   If you need help, do you have trouble finding someone available to you? No   Have you been bothered in the last four weeks by sexual problems? No   Do you have difficulty concentrating, remembering or making decisions? No         Does the patient have evidence of cognitive impairment? No    Asprin use counseling:Does not need ASA (and currently is not on it)    Age-appropriate Screening Schedule:  Refer to the list below for future screening recommendations based on patient's age, sex and/or medical conditions. Orders for these recommended tests are listed  in the plan section. The patient has been provided with a written plan.    Health Maintenance   Topic Date Due   • PNEUMOCOCCAL VACCINE (19-64 MEDIUM RISK) (1 of 1 - PPSV23) 04/11/1975   • TDAP/TD VACCINES (1 - Tdap) 04/11/1975   • ZOSTER VACCINE (1 of 2) 04/11/2006   • URINE MICROALBUMIN  05/02/2019   • DXA SCAN  09/05/2019   • HEMOGLOBIN A1C  09/26/2019   • DIABETIC FOOT EXAM  01/12/2020   • DIABETIC EYE EXAM  01/12/2020   • COLONOSCOPY  01/16/2023   • INFLUENZA VACCINE  Completed          The following portions of the patient's history were reviewed and updated as appropriate: past family history and past surgical history.    Outpatient Medications Prior to Visit   Medication Sig Dispense Refill   • alendronate (FOSAMAX) 70 MG tablet Take 1 tablet by mouth weekly. Take 30 min before 1st meal, do not lay down 30 min after taking med, take with full glass of water. 4 tablet 11   • baclofen (LIORESAL) 10 MG tablet Take 10 mg by mouth 3 (three) times a day.     • Blood Glucose Monitoring Suppl (ACCU-CHEK LULA PLUS) w/Device kit 1 kit 4 (Four) Times a Day. 1 kit 1   • calcium carbonate (OS-JOE) 600 MG tablet Take 600 mg by mouth daily.     • Cholecalciferol (VITAMIN D3) 3000 units tablet Take 5,000 Units by mouth. Takes 1000 iu daily      • Continuous Blood Gluc  (FREESTYLE ELIJAH READER) device 1 each 3 (Three) Times a Day. 1 Device 0   • Continuous Blood Gluc Sensor (FREESTYLE ELIJAH SENSOR SYSTEM) misc 1 each 3 (Three) Times a Day. 3 each 12   • gabapentin (NEURONTIN) 300 MG capsule Take 300 mg by mouth 2 (two) times a day.     • glimepiride (AMARYL) 1 MG tablet TAKE 1 TABLET BY MOUTH IN THE MORNING BEFORE  BREAKFAST 90 tablet 3   • glucose blood (ACCU-CHEK LULA) test strip Use as instructed 50 each 12   • HYDROcodone-acetaminophen (NORCO)  MG per tablet      • Lancets Misc. (ACCU-CHEK FASTCLIX LANCET) kit 1 kit 4 (Four) Times a Day. 1 kit 1   • NARCAN 4 MG/0.1ML nasal spray      • NEXIUM 40 MG  "capsule TAKE 1 CAPSULE BY MOUTH IN THE MORNING BEFORE BREAKFAST 21 capsule 0   • REGRANEX 0.01 % gel      • tamsulosin (FLOMAX) 0.4 MG capsule 24 hr capsule Take 1 capsule by mouth Every Night.     • VENTOLIN  (90 Base) MCG/ACT inhaler INHALE TWO PUFFS BY MOUTH EVERY 4 HOURS AS NEEDED FOR WHEEZING FOR SHORTNESS OF BREATH 1 inhaler 1   • VOLTAREN 1 % gel gel 3 (three) times a day as needed.     • XARELTO 20 MG tablet TAKE 1 TABLET BY MOUTH ONCE DAILY 90 tablet 1   • NIFEdipine XL (PROCARDIA XL) 30 MG 24 hr tablet Take 1 tablet by mouth Daily. 30 tablet 0   • amoxicillin (AMOXIL) 875 MG tablet Take 1 tablet by mouth 2 (Two) Times a Day. 20 tablet 0   • cephalexin (KEFLEX) 500 MG capsule And by mouth 4 times a day for 10 days 40 capsule 0   • NEXIUM 40 MG capsule Take 1 capsule by mouth Every Morning Before Breakfast. 30 capsule 0     No facility-administered medications prior to visit.        Patient Active Problem List   Diagnosis   • GERD (gastroesophageal reflux disease)   • Arthritis   • ESTEBAN (nonalcoholic steatohepatitis)   • Left renal mass       Advanced Care Planning:  Patient does not have an advance directive - information provided to the patient today    Review of Systems    Compared to one year ago, the patient feels his physical health is the same.  Compared to one year ago, the patient feels his mental health is the same.    Reviewed chart for potential of high risk medication in the elderly: yes  Reviewed chart for potential of harmful drug interactions in the elderly:yes    Objective         Vitals:    11/12/19 0801   BP: 130/60   BP Location: Left arm   Patient Position: Sitting   Pulse: 76   Resp: 20   Temp: 98.7 °F (37.1 °C)   TempSrc: Oral   SpO2: 94%   Weight: 112 kg (247 lb)   Height: 188 cm (74\")   PainSc:   4   PainLoc: Foot       Body mass index is 31.71 kg/m².  Discussed the patient's BMI with him. The BMI is above average; no BMI management plan is appropriate..    Physical Exam      "     Assessment/Plan   Medicare Risks and Personalized Health Plan  CMS Preventative Services Quick Reference  Advance Directive Discussion  Chronic Pain   Diabetic Lab Screening   Fall Risk  Immunizations Discussed/Encouraged (specific immunizations; Pneumococcal 23 )    The above risks/problems have been discussed with the patient.  Pertinent information has been shared with the patient in the After Visit Summary.  Follow up plans and orders are seen below in the Assessment/Plan Section.    Diagnoses and all orders for this visit:    1. Medicare annual wellness visit, subsequent (Primary)    2. Wellness examination  -     CBC & Differential  -     Comprehensive Metabolic Panel  -     Lipid Panel  -     Thyroid Panel With TSH  -     Hemoglobin A1c  -     CBC Auto Differential    3. Diabetic ulcer of left heel associated with diabetes mellitus of other type, unspecified ulcer stage (CMS/[facility])    Other orders  -     mometasone (ELOCON) 0.1 % cream; Apply  topically to the appropriate area as directed Daily.  Dispense: 15 g; Refill: 1      Follow Up:  No Follow-up on file.     An After Visit Summary and PPPS were given to the patient.

## 2019-11-12 NOTE — PATIENT INSTRUCTIONS
Medicare Wellness  Personal Prevention Plan of Service     Date of Office Visit:  2019  Encounter Provider:  Geoff Salamanca MD  Place of Service:  [facility]  Patient Name: Juventino Rueda  :  1955    As part of the Medicare Wellness portion of your visit today, we are providing you with this personalized preventive plan of services (PPPS). This plan is based upon recommendations of the [place] Preventive Services Task Force (USPSTF) and the Advisory Committee on Immunization Practices (ACIP).    This lists the preventive care services that should be considered, and provides dates of when you are due. Items listed as completed are up-to-date and do not require any further intervention.    Health Maintenance   Topic Date Due   • PNEUMOCOCCAL VACCINE (19-64 MEDIUM RISK) (1 of 1 - PPSV23) 1975   • TDAP/TD VACCINES (1 - Tdap) 1975   • ZOSTER VACCINE (1 of 2) 2006   • HEPATITIS C SCREENING  2016   • MEDICARE ANNUAL WELLNESS  2018   • URINE MICROALBUMIN  2019   • DXA SCAN  2019   • HEMOGLOBIN A1C  2019   • DIABETIC FOOT EXAM  2020   • DIABETIC EYE EXAM  2020   • COLONOSCOPY  2023   • INFLUENZA VACCINE  Completed       Orders Placed This Encounter   Procedures   • Comprehensive Metabolic Panel   • Lipid Panel   • Thyroid Panel With TSH   • Hemoglobin A1c   • CBC Auto Differential   • CBC & Differential     Order Specific Question:   Manual Differential     Answer:   No       No Follow-up on file.

## 2019-11-12 NOTE — PROGRESS NOTES
"SUBJECTIVE:  The patient is a 64-year-old white male.  He is here for annual Medicare wellness exam.  He has diabetes.  He currently has a diabetic foot ulcer and is seeing wound care management.  He has a history of hyperlipidemia arthritis GERD ESTEBAN.  He stopped nifedipine for hypertension because \"it made me feel funny.\"    PAST MEDICAL HISTORY:  Reviewed.    REVIEW OF SYSTEMS:  Please see above; 14 point ROS negative.      OBJECTIVE:   Vitals signs are reviewed and are stable.    General:  Well-nourished.  Alert and oriented x3 in no acute distress.  HEENT: PERRLA.   Neck:  Supple.   Lungs:  Clear.    Heart:  Regular rate and rhythm.   Abdomen:   Soft, nontender.   Extremities:  No cyanosis, clubbing or edema.  The wound is not examined.  The patient has a wrap and will see wound care today.  Neurological:  Grossly intact without motor or sensory deficits.   CBC today shows white count of 3200.  ASSESSMENT:    Medicare wellness exam  Hypertension  Leukopenia.  PLAN: CBC CMP fasting lipids hemoglobin A1c  He will be referred to hematology for leukopenia.  Dictated utilizing Dragon dictation.  "

## 2019-11-30 NOTE — PROGRESS NOTES
Subjective   Juventino Rueda is a 62 y.o. male is here today for follow-up.  Chief Complaint   Patient presents with   • Abnormal Imaging     Fatty Liver     History of Present Illness  Patient recently underwent CT of the chest and fatty liver was noted.  There were no other associated abnormalities of the fatty liver is suggestive that liver disease.  He was also noted to have some small cyst versus mass in the left kidney.  He does have a urologist that he sees but I do not believe is aware of the findings.  Patient rarely drinks alcohol and is just recently been wrestling with issues associated with diabetes.  The following portions of the patient's history were reviewed and updated as appropriate: allergies, current medications, past family history, past medical history, past social history, past surgical history and problem list.    Review of Systems   Respiratory: Negative for shortness of breath.    Cardiovascular: Negative for chest pain.       Objective   Physical Exam   Constitutional: He appears well-developed and well-nourished.   Nursing note and vitals reviewed.        Assessment/Plan   Problems Addressed this Visit        Digestive    ESTEBAN (nonalcoholic steatohepatitis) - Primary       Genitourinary    Left renal mass        We discussed need for weight loss, exercise as best he can do, and prudent diabetic control.  He has a urologist who follows him so I recommended that he follow up with his urologist regarding the abnormal CT scan of the kidney.            No

## 2020-01-09 ENCOUNTER — CONSULT (OUTPATIENT)
Dept: ONCOLOGY | Facility: CLINIC | Age: 65
End: 2020-01-09

## 2020-01-09 ENCOUNTER — LAB (OUTPATIENT)
Dept: LAB | Facility: HOSPITAL | Age: 65
End: 2020-01-09

## 2020-01-09 VITALS
RESPIRATION RATE: 16 BRPM | DIASTOLIC BLOOD PRESSURE: 82 MMHG | SYSTOLIC BLOOD PRESSURE: 161 MMHG | HEART RATE: 88 BPM | HEIGHT: 72 IN | BODY MASS INDEX: 33.48 KG/M2 | WEIGHT: 247.2 LBS | OXYGEN SATURATION: 90 % | TEMPERATURE: 97.7 F

## 2020-01-09 DIAGNOSIS — D64.9 NORMOCHROMIC NORMOCYTIC ANEMIA: Primary | ICD-10-CM

## 2020-01-09 DIAGNOSIS — D72.819 LEUKOPENIA, UNSPECIFIED TYPE: Primary | ICD-10-CM

## 2020-01-09 DIAGNOSIS — D70.9 NEUTROPENIA, UNSPECIFIED TYPE (HCC): ICD-10-CM

## 2020-01-09 LAB
BASOPHILS # BLD AUTO: 0.03 10*3/MM3 (ref 0–0.2)
BASOPHILS NFR BLD AUTO: 0.8 % (ref 0–1.5)
DEPRECATED RDW RBC AUTO: 40.5 FL (ref 37–54)
EOSINOPHIL # BLD AUTO: 0.13 10*3/MM3 (ref 0–0.4)
EOSINOPHIL NFR BLD AUTO: 3.3 % (ref 0.3–6.2)
ERYTHROCYTE [DISTWIDTH] IN BLOOD BY AUTOMATED COUNT: 11.9 % (ref 12.3–15.4)
FERRITIN SERPL-MCNC: 140.9 NG/ML (ref 30–400)
FOLATE SERPL-MCNC: 15.8 NG/ML (ref 4.78–24.2)
HCT VFR BLD AUTO: 39.5 % (ref 37.5–51)
HGB BLD-MCNC: 12.9 G/DL (ref 13–17.7)
HGB RETIC QN AUTO: 35.1 PG (ref 29.8–36.1)
IMM GRANULOCYTES # BLD AUTO: 0.01 10*3/MM3 (ref 0–0.05)
IMM GRANULOCYTES NFR BLD AUTO: 0.3 % (ref 0–0.5)
IMM RETICS NFR: 6.3 % (ref 3–15.8)
IRON 24H UR-MRATE: 80 MCG/DL (ref 59–158)
IRON SATN MFR SERPL: 19 % (ref 14–48)
LYMPHOCYTES # BLD AUTO: 1.13 10*3/MM3 (ref 0.7–3.1)
LYMPHOCYTES NFR BLD AUTO: 28.9 % (ref 19.6–45.3)
MCH RBC QN AUTO: 30.4 PG (ref 26.6–33)
MCHC RBC AUTO-ENTMCNC: 32.7 G/DL (ref 31.5–35.7)
MCV RBC AUTO: 93.2 FL (ref 79–97)
MONOCYTES # BLD AUTO: 0.4 10*3/MM3 (ref 0.1–0.9)
MONOCYTES NFR BLD AUTO: 10.2 % (ref 5–12)
NEUTROPHILS # BLD AUTO: 2.21 10*3/MM3 (ref 1.7–7)
NEUTROPHILS NFR BLD AUTO: 56.5 % (ref 42.7–76)
NRBC BLD AUTO-RTO: 0 /100 WBC (ref 0–0.2)
PLATELET # BLD AUTO: 156 10*3/MM3 (ref 140–450)
PMV BLD AUTO: 9.2 FL (ref 6–12)
RBC # BLD AUTO: 4.24 10*6/MM3 (ref 4.14–5.8)
RETICS/RBC NFR AUTO: 1.26 % (ref 0.7–1.9)
TIBC SERPL-MCNC: 412 MCG/DL (ref 249–505)
TRANSFERRIN SERPL-MCNC: 294 MG/DL (ref 200–360)
VIT B12 BLD-MCNC: 1194 PG/ML (ref 211–946)
WBC NRBC COR # BLD: 3.91 10*3/MM3 (ref 3.4–10.8)

## 2020-01-09 PROCEDURE — 99204 OFFICE O/P NEW MOD 45 MIN: CPT | Performed by: INTERNAL MEDICINE

## 2020-01-09 PROCEDURE — 83540 ASSAY OF IRON: CPT | Performed by: INTERNAL MEDICINE

## 2020-01-09 PROCEDURE — 82607 VITAMIN B-12: CPT | Performed by: INTERNAL MEDICINE

## 2020-01-09 PROCEDURE — 85025 COMPLETE CBC W/AUTO DIFF WBC: CPT

## 2020-01-09 PROCEDURE — 36415 COLL VENOUS BLD VENIPUNCTURE: CPT

## 2020-01-09 PROCEDURE — 82728 ASSAY OF FERRITIN: CPT | Performed by: INTERNAL MEDICINE

## 2020-01-09 PROCEDURE — 85046 RETICYTE/HGB CONCENTRATE: CPT | Performed by: INTERNAL MEDICINE

## 2020-01-09 PROCEDURE — 84466 ASSAY OF TRANSFERRIN: CPT | Performed by: INTERNAL MEDICINE

## 2020-01-09 PROCEDURE — 82746 ASSAY OF FOLIC ACID SERUM: CPT | Performed by: INTERNAL MEDICINE

## 2020-01-09 RX ORDER — DIPHENHYDRAMINE HCL 25 MG
25 CAPSULE ORAL EVERY 6 HOURS PRN
COMMUNITY

## 2020-01-09 NOTE — PROGRESS NOTES
Subjective     REASON FOR CONSULTATION:  Provide an opinion on any further workup or treatment on:    Leukopenia                       REQUESTING PHYSICIAN: No ref. provider found    RECORDS OBTAINED: Records of the patients history including those obtained from the referring provider were reviewed and summarized in detail.    HISTORY OF PRESENT ILLNESS:    Juventino Rueda is a 64 y.o. patient who was referred for evaluation of leukopenia.  This was identified in labs in 2019.  His most recent CBC on 11/12/2019 revealed a WBC count of 3200.  He was also found to be anemic.  He reports having significant fatigue.  He does not have the energy to do much.  He has diabetes mellitus and has been dealing with an ulcer in the left leg that took 9 months to heal.    Patient has been on proton pump inhibitor for the past 3 years.  He is taking Nexium 3 days weekly.  His diet has good amount of red meat.  However, he does not eat adequate amount of vegetables.    About 3 weeks ago, started to take a One-A-Day vitamin, vitamin B6 and vitamin B12 in addition to vitamin D3.  He continues to have the same symptoms of fatigue, however.       REVIEW OF SYSTEMS:  Review of Systems   Constitutional: Positive for fatigue. Negative for chills, fever and unexpected weight change.   HENT: Negative for mouth sores, nosebleeds, sore throat and voice change.    Eyes: Negative for visual disturbance.   Respiratory: Positive for shortness of breath. Negative for cough.    Cardiovascular: Negative for chest pain and leg swelling.   Gastrointestinal: Negative for abdominal pain, blood in stool, constipation, diarrhea, nausea and vomiting.   Genitourinary: Negative for dysuria, frequency and hematuria.   Musculoskeletal: Negative for arthralgias, back pain and joint swelling.   Skin: Positive for rash.   Neurological: Positive for headaches. Negative for dizziness and numbness.   Hematological: Negative for adenopathy. Does not bruise/bleed  easily.   Psychiatric/Behavioral: Negative for dysphoric mood. The patient is not nervous/anxious.          Past Medical History:   Diagnosis Date   • Arthritis    • Chronic pain    • Diabetes (CMS/HCC)    • DVT (deep venous thrombosis) (CMS/HCC)    • GERD (gastroesophageal reflux disease)    • Osteoporosis    • PVD (peripheral vascular disease) (CMS/HCC)    • Sleep apnea        Past Surgical History:   Procedure Laterality Date   • COLONOSCOPY  01/16/2013    Aly Ortiz MD/Recall 10 yr 2023   • HERNIA REPAIR     • JOINT REPLACEMENT      left ankle   • LEG SURGERY     • SKIN GRAFT     • TONSILLECTOMY     • UPPER GASTROINTESTINAL ENDOSCOPY  09/13/2013    Aly Ortiz MD       Social History     Socioeconomic History   • Marital status:      Spouse name: Linnette   • Number of children: Not on file   • Years of education: Not on file   • Highest education level: Not on file   Occupational History     Employer: [employer]   Tobacco Use   • Smoking status: Never Smoker   • Smokeless tobacco: Never Used   Substance and Sexual Activity   • Alcohol use: Yes     Comment: occas   • Drug use: No   • Sexual activity: Defer       Cancer-related family history includes Cancer in his brother.    MEDICATIONS:    Current Outpatient Medications:   •  alendronate (FOSAMAX) 70 MG tablet, Take 1 tablet by mouth weekly. Take 30 min before 1st meal, do not lay down 30 min after taking med, take with full glass of water., Disp: 4 tablet, Rfl: 11  •  baclofen (LIORESAL) 10 MG tablet, Take 10 mg by mouth 3 (three) times a day., Disp: , Rfl:   •  calcium carbonate (OS-JOE) 600 MG tablet, Take 600 mg by mouth daily., Disp: , Rfl:   •  Cholecalciferol (VITAMIN D3) 3000 units tablet, Take 5,000 Units by mouth. Takes 1000 iu daily , Disp: , Rfl:   •  Continuous Blood Gluc Sensor (FREESTYLE ELIJAH SENSOR SYSTEM) misc, 1 each 3 (Three) Times a Day., Disp: 3 each, Rfl: 12  •  diphenhydrAMINE (BENADRYL ALLERGY) 25 mg capsule, Take 25 mg by  "mouth Every 6 (Six) Hours As Needed for Itching., Disp: , Rfl:   •  gabapentin (NEURONTIN) 300 MG capsule, Take 300 mg by mouth 2 (two) times a day., Disp: , Rfl:   •  glimepiride (AMARYL) 1 MG tablet, TAKE 1 TABLET BY MOUTH IN THE MORNING BEFORE  BREAKFAST, Disp: 90 tablet, Rfl: 3  •  glucose blood (ACCU-CHEK LULA) test strip, Use as instructed, Disp: 50 each, Rfl: 12  •  HYDROcodone-acetaminophen (NORCO)  MG per tablet, , Disp: , Rfl:   •  NARCAN 4 MG/0.1ML nasal spray, , Disp: , Rfl:   •  NEXIUM 40 MG capsule, TAKE ONE CAPSULE BY MOUTH EVERY MORNING BEFORE BREAKFAST, Disp: 30 capsule, Rfl: 2  •  tamsulosin (FLOMAX) 0.4 MG capsule 24 hr capsule, Take 1 capsule by mouth Every Night., Disp: , Rfl:   •  VENTOLIN  (90 Base) MCG/ACT inhaler, INHALE TWO PUFFS BY MOUTH EVERY 4 HOURS AS NEEDED FOR WHEEZING FOR SHORTNESS OF BREATH, Disp: 1 inhaler, Rfl: 1  •  VOLTAREN 1 % gel gel, 3 (three) times a day as needed., Disp: , Rfl:   •  XARELTO 20 MG tablet, TAKE 1 TABLET BY MOUTH ONCE DAILY, Disp: 90 tablet, Rfl: 1  •  Blood Glucose Monitoring Suppl (ACCU-CHEK LULA PLUS) w/Device kit, 1 kit 4 (Four) Times a Day., Disp: 1 kit, Rfl: 1  •  Lancets Misc. (ACCU-CHEK FASTCLIX LANCET) kit, 1 kit 4 (Four) Times a Day., Disp: 1 kit, Rfl: 1  •  mometasone (ELOCON) 0.1 % cream, Apply  topically to the appropriate area as directed Daily., Disp: 15 g, Rfl: 1  •  NIFEdipine XL (PROCARDIA XL) 30 MG 24 hr tablet, Take 1 tablet by mouth Daily., Disp: 30 tablet, Rfl: 0     ALLERGIES:  Allergies   Allergen Reactions   • Morphine And Related Unknown - High Severity     Comatose after morphine   • Naproxen Hives   • Nsaids Hives   • Promethazine Nausea And Vomiting     Pt states he wont wake up from it        Objective   VITAL SIGNS:  Vitals:    01/09/20 1328   BP: 161/82   Pulse: 88   Resp: 16   Temp: 97.7 °F (36.5 °C)   TempSrc: Oral   SpO2: 90%   Weight: 112 kg (247 lb 3.2 oz)   Height: 183 cm (72.05\")  Comment: new ht   PainSc: " 0-No pain       Wt Readings from Last 3 Encounters:   01/09/20 112 kg (247 lb 3.2 oz)   11/12/19 112 kg (247 lb)   08/28/18 110 kg (242 lb)       PHYSICAL EXAMINATION  GENERAL:  The patient appears in good general condition, not in acute distress.  SKIN: Warm and dry.  No ecchymosis.    HEAD:  Normocephalic.  EYES:  No Jaundice. No Pallor. Pupils equal. EOMI.  NECK:  Supple with Good ROM. No Thyromegaly. No Masses.  LYMPHATICS:  No cervical or supraclavicular lymphadenopathy.  Decreased breath sounds left lung base.  CHEST: Normal respiratory effort. Lungs clear to auscultation.   CARDIAC:  Normal S1 & S2. No murmur. No edema.  ABDOMEN: Protuberant. No tenderness. No Hepatomegaly. No Splenomegaly. No masses.  EXTREMITIES:  The patient has a healed ulcer in the left leg covered with a dressing.  NEUROLOGICAL:  No Focal neurological deficits.         RESULT REVIEW:   Results from last 7 days   Lab Units 01/09/20  1315   WBC 10*3/mm3 3.91   NEUTROS ABS 10*3/mm3 2.21   HEMOGLOBIN g/dL 12.9*   HEMATOCRIT % 39.5   PLATELETS 10*3/mm3 156     I reviewed peripheral blood smear from today.  WBCs have normal morphology.  No premature WBCs or blasts were identified.  RBCs have normal morphology.  Platelets were normal in size and number.    Assessment/Plan   1.  Leukopenia with neutropenia.  This was identified on labs in 2019.  The lowest ANC was 1070 on 3/26/2019.  Neutrophil count normalized on today's lab and it is at 2200.  The low level is likely secondary to vitamin B12 and/or folate deficiency.  Patient does not have lymphadenopathy or splenomegaly on exam.  Neutropenia is not a common side effect of the medicines he is currently taking.    2.  Normochromic normocytic anemia.  Patient reports significant fatigue.  In addition to the B12 and folate deficiency described above, he may also be deficient in iron and this would be explained by ongoing proton pump inhibitor therapy.    PLAN:    1.  I will obtain ferritin, iron  panel, B12, folate and methylmalonic acid levels.  2.  We will contact the patient with the results.  We will advise him regarding additional vitamin replacement accordingly.  3.  I will see the patient in follow-up in 3 months with repeat CBC.        Jewell Kendrick MD  01/09/20

## 2020-01-12 LAB
Lab: NORMAL
METHYLMALONATE SERPL-SCNC: 266 NMOL/L (ref 0–378)

## 2020-01-13 ENCOUNTER — TELEPHONE (OUTPATIENT)
Dept: ONCOLOGY | Facility: CLINIC | Age: 65
End: 2020-01-13

## 2020-01-13 NOTE — TELEPHONE ENCOUNTER
Inform the patient that his lab showed good levels of vitamin.  To continue the current vitamins he takes and does not need any additional vitamin supplement. Pt v/u.      ----- Message from Jewell Kendrick MD sent at 1/13/2020  9:41 AM EST -----  Please inform the patient that his lab showed good level of vitamins. Please ask him to continue the current vitamins he takes. He does not need to add another vitamin supplement.    Thank you

## 2020-02-11 ENCOUNTER — PRIOR AUTHORIZATION (OUTPATIENT)
Dept: FAMILY MEDICINE CLINIC | Facility: CLINIC | Age: 65
End: 2020-02-11

## 2020-04-08 ENCOUNTER — TELEPHONE (OUTPATIENT)
Dept: ONCOLOGY | Facility: CLINIC | Age: 65
End: 2020-04-08

## 2020-04-14 ENCOUNTER — APPOINTMENT (OUTPATIENT)
Dept: LAB | Facility: HOSPITAL | Age: 65
End: 2020-04-14

## 2020-05-04 RX ORDER — RIVAROXABAN 20 MG/1
TABLET, FILM COATED ORAL
Qty: 90 TABLET | Refills: 0 | Status: SHIPPED | OUTPATIENT
Start: 2020-05-04 | End: 2020-09-08

## 2020-07-23 ENCOUNTER — TELEPHONE (OUTPATIENT)
Dept: FAMILY MEDICINE CLINIC | Facility: CLINIC | Age: 65
End: 2020-07-23

## 2020-07-23 RX ORDER — ESOMEPRAZOLE MAGNESIUM 40 MG/1
40 CAPSULE, DELAYED RELEASE ORAL DAILY
Qty: 90 CAPSULE | Refills: 3 | Status: SHIPPED | OUTPATIENT
Start: 2020-07-23 | End: 2021-08-23

## 2020-07-23 NOTE — TELEPHONE ENCOUNTER
PATIENT CALLED IN AND STATED HE WAS PRESCRIBED THE  NEXIUM  40  AND  WAS GIVEN THE  GENERIC  PATIENT STATED HE CANT TAKE THAT . PLEASE SEND TO [id number] - [place], [place] - [address] AT [facility] OF [facility] [address] - [phone] [facility] [phone]   [phone]      PATIENT CALL BACK  [phone].

## 2020-08-22 RX ORDER — MOMETASONE FUROATE 1 MG/G
CREAM TOPICAL
Qty: 15 G | Refills: 0 | Status: SHIPPED | OUTPATIENT
Start: 2020-08-22

## 2020-09-08 RX ORDER — RIVAROXABAN 20 MG/1
TABLET, FILM COATED ORAL
Qty: 90 TABLET | Refills: 0 | Status: SHIPPED | OUTPATIENT
Start: 2020-09-08 | End: 2020-12-14

## 2020-09-11 ENCOUNTER — OFFICE VISIT (OUTPATIENT)
Dept: ONCOLOGY | Facility: CLINIC | Age: 65
End: 2020-09-11

## 2020-09-11 ENCOUNTER — LAB (OUTPATIENT)
Dept: LAB | Facility: HOSPITAL | Age: 65
End: 2020-09-11

## 2020-09-11 VITALS
DIASTOLIC BLOOD PRESSURE: 74 MMHG | HEIGHT: 72 IN | SYSTOLIC BLOOD PRESSURE: 153 MMHG | TEMPERATURE: 97.3 F | BODY MASS INDEX: 32.02 KG/M2 | WEIGHT: 236.4 LBS | HEART RATE: 56 BPM | OXYGEN SATURATION: 92 % | RESPIRATION RATE: 18 BRPM

## 2020-09-11 DIAGNOSIS — D70.9 NEUTROPENIA, UNSPECIFIED TYPE (HCC): Primary | ICD-10-CM

## 2020-09-11 DIAGNOSIS — D50.9 IRON DEFICIENCY ANEMIA, UNSPECIFIED IRON DEFICIENCY ANEMIA TYPE: ICD-10-CM

## 2020-09-11 DIAGNOSIS — K21.9 GASTROESOPHAGEAL REFLUX DISEASE WITHOUT ESOPHAGITIS: ICD-10-CM

## 2020-09-11 DIAGNOSIS — D64.9 NORMOCHROMIC NORMOCYTIC ANEMIA: ICD-10-CM

## 2020-09-11 DIAGNOSIS — N28.89 LEFT RENAL MASS: ICD-10-CM

## 2020-09-11 DIAGNOSIS — D72.819 LEUKOPENIA, UNSPECIFIED TYPE: Primary | ICD-10-CM

## 2020-09-11 LAB
BASOPHILS # BLD AUTO: 0.02 10*3/MM3 (ref 0–0.2)
BASOPHILS NFR BLD AUTO: 0.6 % (ref 0–1.5)
DEPRECATED RDW RBC AUTO: 44.5 FL (ref 37–54)
EOSINOPHIL # BLD AUTO: 0.15 10*3/MM3 (ref 0–0.4)
EOSINOPHIL NFR BLD AUTO: 4.4 % (ref 0.3–6.2)
ERYTHROCYTE [DISTWIDTH] IN BLOOD BY AUTOMATED COUNT: 12.5 % (ref 12.3–15.4)
FERRITIN SERPL-MCNC: 113.1 NG/ML (ref 30–400)
HCT VFR BLD AUTO: 41.3 % (ref 37.5–51)
HGB BLD-MCNC: 12.8 G/DL (ref 13–17.7)
IMM GRANULOCYTES # BLD AUTO: 0.02 10*3/MM3 (ref 0–0.05)
IMM GRANULOCYTES NFR BLD AUTO: 0.6 % (ref 0–0.5)
IRON 24H UR-MRATE: 59 MCG/DL (ref 59–158)
IRON SATN MFR SERPL: 16 % (ref 14–48)
LYMPHOCYTES # BLD AUTO: 0.89 10*3/MM3 (ref 0.7–3.1)
LYMPHOCYTES NFR BLD AUTO: 26.1 % (ref 19.6–45.3)
MCH RBC QN AUTO: 29.8 PG (ref 26.6–33)
MCHC RBC AUTO-ENTMCNC: 31 G/DL (ref 31.5–35.7)
MCV RBC AUTO: 96 FL (ref 79–97)
MONOCYTES # BLD AUTO: 0.34 10*3/MM3 (ref 0.1–0.9)
MONOCYTES NFR BLD AUTO: 10 % (ref 5–12)
NEUTROPHILS NFR BLD AUTO: 1.99 10*3/MM3 (ref 1.7–7)
NEUTROPHILS NFR BLD AUTO: 58.3 % (ref 42.7–76)
NRBC BLD AUTO-RTO: 0 /100 WBC (ref 0–0.2)
PLATELET # BLD AUTO: 153 10*3/MM3 (ref 140–450)
PMV BLD AUTO: 8.9 FL (ref 6–12)
RBC # BLD AUTO: 4.3 10*6/MM3 (ref 4.14–5.8)
TIBC SERPL-MCNC: 371 MCG/DL (ref 249–505)
TRANSFERRIN SERPL-MCNC: 265 MG/DL (ref 200–360)
VIT B12 BLD-MCNC: >2000 PG/ML (ref 211–946)
WBC # BLD AUTO: 3.41 10*3/MM3 (ref 3.4–10.8)

## 2020-09-11 PROCEDURE — 99213 OFFICE O/P EST LOW 20 MIN: CPT | Performed by: INTERNAL MEDICINE

## 2020-09-11 PROCEDURE — 85025 COMPLETE CBC W/AUTO DIFF WBC: CPT

## 2020-09-11 PROCEDURE — 83883 ASSAY NEPHELOMETRY NOT SPEC: CPT | Performed by: INTERNAL MEDICINE

## 2020-09-11 PROCEDURE — 86334 IMMUNOFIX E-PHORESIS SERUM: CPT | Performed by: INTERNAL MEDICINE

## 2020-09-11 PROCEDURE — 84466 ASSAY OF TRANSFERRIN: CPT

## 2020-09-11 PROCEDURE — 36415 COLL VENOUS BLD VENIPUNCTURE: CPT

## 2020-09-11 PROCEDURE — 82607 VITAMIN B-12: CPT | Performed by: INTERNAL MEDICINE

## 2020-09-11 PROCEDURE — 84155 ASSAY OF PROTEIN SERUM: CPT | Performed by: INTERNAL MEDICINE

## 2020-09-11 PROCEDURE — 82784 ASSAY IGA/IGD/IGG/IGM EACH: CPT | Performed by: INTERNAL MEDICINE

## 2020-09-11 PROCEDURE — 84165 PROTEIN E-PHORESIS SERUM: CPT | Performed by: INTERNAL MEDICINE

## 2020-09-11 PROCEDURE — 82728 ASSAY OF FERRITIN: CPT

## 2020-09-11 PROCEDURE — 83540 ASSAY OF IRON: CPT

## 2020-09-11 RX ORDER — DOXYCYCLINE 100 MG/1
CAPSULE ORAL
COMMUNITY
Start: 2020-08-27 | End: 2020-11-16

## 2020-09-11 NOTE — PROGRESS NOTES
Subjective     CHIEF COMPLAINT:      Chief Complaint   Patient presents with   • Follow-up       HISTORY OF PRESENT ILLNESS:     Juventino Rueda is a 65 y.o. male patient who returns today for follow up on his anemia and neutropenia.  He returns today for follow-up and reports fatigue.  He feels that he does not have the energy to do multiple activities that he would want to do.  No shortness of breath.  He reports occasional feeling of weakness in his hands.    Patient did not have any recent infections.    REVIEW OF SYSTEMS:  Review of Systems   Constitutional: Positive for fatigue. Negative for fever and unexpected weight change.   HENT: Negative for nosebleeds and voice change.    Eyes: Negative for visual disturbance.   Respiratory: Negative for cough and shortness of breath.    Cardiovascular: Negative for chest pain and leg swelling.   Gastrointestinal: Negative for abdominal pain, blood in stool, constipation, diarrhea, nausea and vomiting.   Genitourinary: Negative for frequency and hematuria.   Musculoskeletal: Negative for back pain and joint swelling.   Skin: Negative for rash.   Neurological: Positive for weakness. Negative for dizziness and headaches.   Hematological: Negative for adenopathy. Does not bruise/bleed easily.   Psychiatric/Behavioral: Negative for dysphoric mood. The patient is not nervous/anxious.           Past Medical History:   Diagnosis Date   • Arthritis    • Chronic pain    • Diabetes (CMS/HCC)    • DVT (deep venous thrombosis) (CMS/HCC)    • GERD (gastroesophageal reflux disease)    • Osteoporosis    • PVD (peripheral vascular disease) (CMS/HCC)    • Sleep apnea    • Tuberculosis        Past Surgical History:   Procedure Laterality Date   • COLONOSCOPY  01/16/2013    Aly Ortiz MD/Recall 10 yr 2023   • HERNIA REPAIR     • JOINT REPLACEMENT      left ankle   • LEG SURGERY     • SKIN GRAFT     • TONSILLECTOMY     • UPPER GASTROINTESTINAL ENDOSCOPY  09/13/2013    Aly Ortiz MD        Cancer-related family history includes Cancer in his brother.  Social History     Tobacco Use   • Smoking status: Never Smoker   • Smokeless tobacco: Never Used   Substance Use Topics   • Alcohol use: Yes     Comment: occas       MEDICATIONS:    Current Outpatient Medications:   •  alendronate (FOSAMAX) 70 MG tablet, Take 1 tablet by mouth weekly. Take 30 min before 1st meal, do not lay down 30 min after taking med, take with full glass of water., Disp: 4 tablet, Rfl: 11  •  baclofen (LIORESAL) 10 MG tablet, Take 10 mg by mouth 3 (three) times a day., Disp: , Rfl:   •  Blood Glucose Monitoring Suppl (ACCU-CHEK LULA PLUS) w/Device kit, 1 kit 4 (Four) Times a Day., Disp: 1 kit, Rfl: 1  •  calcium carbonate (OS-JOE) 600 MG tablet, Take 600 mg by mouth daily., Disp: , Rfl:   •  Cholecalciferol (VITAMIN D3) 3000 units tablet, Take 5,000 Units by mouth. Takes 1000 iu daily , Disp: , Rfl:   •  Continuous Blood Gluc Sensor ([employer] ELIJAH SENSOR SYSTEM) misc, 1 each 3 (Three) Times a Day., Disp: 3 each, Rfl: 12  •  diphenhydrAMINE (BENADRYL ALLERGY) 25 mg capsule, Take 25 mg by mouth Every 6 (Six) Hours As Needed for Itching., Disp: , Rfl:   •  doxycycline (MONODOX) 100 MG capsule, , Disp: , Rfl:   •  esomeprazole (nexIUM) 40 MG capsule, Take 1 capsule by mouth Daily., Disp: 90 capsule, Rfl: 3  •  gabapentin (NEURONTIN) 300 MG capsule, Take 300 mg by mouth 2 (two) times a day., Disp: , Rfl:   •  glimepiride (AMARYL) 1 MG tablet, TAKE 1 TABLET BY MOUTH IN THE MORNING BEFORE  BREAKFAST, Disp: 90 tablet, Rfl: 3  •  glucose blood (ACCU-CHEK LULA) test strip, Use as instructed, Disp: 50 each, Rfl: 12  •  HYDROcodone-acetaminophen (NORCO)  MG per tablet, , Disp: , Rfl:   •  Lancets Misc. (ACCU-CHEK FASTCLIX LANCET) kit, 1 kit 4 (Four) Times a Day., Disp: 1 kit, Rfl: 1  •  mometasone (ELOCON) 0.1 % cream, APPLY  CREAM TOPICALLY TO AFFECTED AREA ONCE DAILY AS DIRECTED, Disp: 15 g, Rfl: 0  •  mupirocin (BACTROBAN) 2 %  "ointment, , Disp: , Rfl:   •  NARCAN 4 MG/0.1ML nasal spray, , Disp: , Rfl:   •  tamsulosin (FLOMAX) 0.4 MG capsule 24 hr capsule, Take 1 capsule by mouth Every Night., Disp: , Rfl:   •  VENTOLIN  (90 Base) MCG/ACT inhaler, INHALE TWO PUFFS BY MOUTH EVERY 4 HOURS AS NEEDED FOR WHEEZING FOR SHORTNESS OF BREATH, Disp: 1 inhaler, Rfl: 1  •  VOLTAREN 1 % gel gel, 3 (three) times a day as needed., Disp: , Rfl:   •  XARELTO 20 MG tablet, Take 1 tablet by mouth once daily, Disp: 90 tablet, Rfl: 0    ALLERGIES:  Allergies   Allergen Reactions   • Morphine And Related Unknown - High Severity     Comatose after morphine   • Naproxen Hives   • Nsaids Hives   • Promethazine Nausea And Vomiting     Pt states he wont wake up from it         Objective   VITAL SIGNS:     Vitals:    09/11/20 1104   BP: 153/74   Pulse: 56   Resp: 18   Temp: 97.3 °F (36.3 °C)   TempSrc: Skin   SpO2: 92%   Weight: 107 kg (236 lb 6.4 oz)   Height: 183 cm (72.05\")   PainSc: 0-No pain     Body mass index is 32.02 kg/m².     Wt Readings from Last 3 Encounters:   09/11/20 107 kg (236 lb 6.4 oz)   01/09/20 112 kg (247 lb 3.2 oz)   11/12/19 112 kg (247 lb)       PHYSICAL EXAMINATION:  GENERAL:  The patient appears in fair general condition, not in acute distress.  SKIN: No skin rashes. No ecchymosis.  HEAD:  Normocephalic.  EYES:  No Jaundice. No Pallor. Pupils equal. EOMI.  NECK:  Supple with Good ROM. No Thyromegaly. No Masses.  LYMPHATICS:  No cervical or supraclavicular or axillary lymphadenopathy.  CHEST: Normal respiratory effort.   CARDIAC:  No edema.  ABDOMEN:  Soft. No tenderness. No Hepatomegaly. No Splenomegaly. No masses.  EXTREMITIES:  No clubbing. No deforming arthritis in the hands. No Calf tenderness.  NEUROLOGICAL:  No Focal neurological deficits.       DIAGNOSTIC DATA:     Results from last 7 days   Lab Units 09/11/20  1108   WBC 10*3/mm3 3.41   NEUTROS ABS 10*3/mm3 1.99   HEMOGLOBIN g/dL 12.8*   HEMATOCRIT % 41.3   PLATELETS 10*3/mm3 " 153     Component      Latest Ref Rng & Units 1/9/2020 9/11/2020   Iron      59 - 158 mcg/dL 80 59   Iron Saturation      14 - 48 % 19 16   Transferrin      200 - 360 mg/dL 294 265   TIBC      249 - 505 mcg/dL 412 371   Methylmalonic Acid      0 - 378 nmol/L 266    DISCLAIMER       Comment    Vitamin B-12      211 - 946 pg/mL 1,194 (H) >2,000 (H)   Folate      4.78 - 24.20 ng/mL 15.80    Ferritin      30.00 - 400.00 ng/mL 140.90 113.10       Assessment/Plan   1.  Leukopenia with neutropenia.    · This was identified on labs in 2019.    · The lowest ANC was 1070 on 3/26/2019.    · Neutrophil count normalized on 1/9/2020 at 2200.    · There was no evidence of vitamin B12 or folate deficiency explain this.    · Labs from today revealed a low normal WBC.  Neutrophils are normal at 1,990.  · He has no lymphadenopathy or splenomegaly.  · I reassured the patient about the findings.    2.  Normochromic normocytic anemia.  Patient reported fatigue at the initial visit on 1/9/2020.  We obtained iron studies and there was no evidence of deficiency.  Repeat iron studies from today remain normal.  Patient has multiple underlying medical illnesses.    The anemia is likely secondary to anemia of chronic disease.  However, we would need to evaluate for an underlying plasma cell disorder that may also explain the anemia.    PLAN:    1.  I will obtain serum protein electrophoresis, immunofixation and serum free light chains to evaluate for underlying multiple myeloma.   2.  If there is evidence of abnormal monoclonal protein, we will see him in follow-up in 6 months with repeat CBC ferritin iron panel.      Jewell Kendrick MD  09/11/20

## 2020-09-15 LAB
ALBUMIN SERPL-MCNC: 3.7 G/DL (ref 2.9–4.4)
ALBUMIN/GLOB SERPL: 1.3 {RATIO} (ref 0.7–1.7)
ALPHA1 GLOB FLD ELPH-MCNC: 0.3 G/DL (ref 0–0.4)
ALPHA2 GLOB SERPL ELPH-MCNC: 0.7 G/DL (ref 0.4–1)
B-GLOBULIN SERPL ELPH-MCNC: 0.9 G/DL (ref 0.7–1.3)
GAMMA GLOB SERPL ELPH-MCNC: 1.1 G/DL (ref 0.4–1.8)
GLOBULIN SER CALC-MCNC: 3 G/DL (ref 2.2–3.9)
IGA SERPL-MCNC: 334 MG/DL (ref 61–437)
IGG SERPL-MCNC: 1069 MG/DL (ref 603–1613)
IGM SERPL-MCNC: 154 MG/DL (ref 20–172)
INTERPRETATION SERPL IEP-IMP: ABNORMAL
KAPPA LC SERPL-MCNC: 29.4 MG/L (ref 3.3–19.4)
KAPPA LC/LAMBDA SER: 1.77 {RATIO} (ref 0.26–1.65)
LAMBDA LC FREE SERPL-MCNC: 16.6 MG/L (ref 5.7–26.3)
Lab: ABNORMAL
M-SPIKE: ABNORMAL G/DL
PROT SERPL-MCNC: 6.7 G/DL (ref 6–8.5)

## 2020-10-01 ENCOUNTER — TELEPHONE (OUTPATIENT)
Dept: FAMILY MEDICINE CLINIC | Facility: CLINIC | Age: 65
End: 2020-10-01

## 2020-10-01 RX ORDER — GLIMEPIRIDE 1 MG/1
TABLET ORAL
Qty: 90 TABLET | Refills: 0 | Status: SHIPPED | OUTPATIENT
Start: 2020-10-01 | End: 2021-01-02

## 2020-10-01 NOTE — TELEPHONE ENCOUNTER
[facility] CALLING WITH PATIENT ON THE LINE TO SCHEDULE PATIENT WITH DR. FRENCH. NO RECORD WITH [facility] OR OFFICE NOR DID PATIENT KNOW OF WHEN HIS WELCOME TO MEDICARE VISIT WAS OR IF HE EVER HAD ONE AT ALL. IF NO [facility] HAS EVER OCCURRED HE NEEDS TO SCHEDULE THAT, IF HE NEEDS JUST AN AMV NEEDS THAT SCHEDULED THEN TOO.    PLEASE CALL AND ADVISE PATIENT OF WHAT APPOINTMENT HE NEEDS TO MAKE.     PATIENT CAN BE REACHED [phone]

## 2020-11-16 ENCOUNTER — OFFICE VISIT (OUTPATIENT)
Dept: FAMILY MEDICINE CLINIC | Facility: CLINIC | Age: 65
End: 2020-11-16

## 2020-11-16 VITALS
WEIGHT: 241 LBS | SYSTOLIC BLOOD PRESSURE: 130 MMHG | HEIGHT: 72 IN | OXYGEN SATURATION: 97 % | RESPIRATION RATE: 20 BRPM | HEART RATE: 90 BPM | BODY MASS INDEX: 32.64 KG/M2 | TEMPERATURE: 98.4 F | DIASTOLIC BLOOD PRESSURE: 70 MMHG

## 2020-11-16 DIAGNOSIS — R07.89 CHEST WALL PAIN: ICD-10-CM

## 2020-11-16 DIAGNOSIS — E11.9 TYPE 2 DIABETES MELLITUS WITHOUT COMPLICATION, WITHOUT LONG-TERM CURRENT USE OF INSULIN (HCC): Primary | ICD-10-CM

## 2020-11-16 DIAGNOSIS — M25.561 PAIN IN BOTH KNEES, UNSPECIFIED CHRONICITY: ICD-10-CM

## 2020-11-16 DIAGNOSIS — M25.562 PAIN IN BOTH KNEES, UNSPECIFIED CHRONICITY: ICD-10-CM

## 2020-11-16 LAB
ALBUMIN SERPL-MCNC: 4.1 G/DL (ref 3.5–5.2)
ALBUMIN/GLOB SERPL: 1.2 G/DL
ALP SERPL-CCNC: 82 U/L (ref 39–117)
ALT SERPL W P-5'-P-CCNC: 16 U/L (ref 1–41)
ANION GAP SERPL CALCULATED.3IONS-SCNC: 6.8 MMOL/L (ref 5–15)
AST SERPL-CCNC: 17 U/L (ref 1–40)
BACTERIA UR QL AUTO: NORMAL /HPF
BILIRUB SERPL-MCNC: 0.4 MG/DL (ref 0–1.2)
BILIRUB UR QL STRIP: NEGATIVE
BUN SERPL-MCNC: 17 MG/DL (ref 8–23)
BUN/CREAT SERPL: 12.6 (ref 7–25)
CALCIUM SPEC-SCNC: 9.9 MG/DL (ref 8.6–10.5)
CHLORIDE SERPL-SCNC: 99 MMOL/L (ref 98–107)
CHOLEST SERPL-MCNC: 183 MG/DL (ref 0–200)
CLARITY UR: CLEAR
CO2 SERPL-SCNC: 35.2 MMOL/L (ref 22–29)
COLOR UR: YELLOW
CREAT SERPL-MCNC: 1.35 MG/DL (ref 0.76–1.27)
ERYTHROCYTE [DISTWIDTH] IN BLOOD BY AUTOMATED COUNT: 14.2 % (ref 12.3–15.4)
GFR SERPL CREATININE-BSD FRML MDRD: 53 ML/MIN/1.73
GLOBULIN UR ELPH-MCNC: 3.3 GM/DL
GLUCOSE SERPL-MCNC: 155 MG/DL (ref 65–99)
GLUCOSE UR STRIP-MCNC: NEGATIVE MG/DL
HBA1C MFR BLD: 6.2 % (ref 4.8–5.6)
HCT VFR BLD AUTO: 38.7 % (ref 37.5–51)
HDLC SERPL-MCNC: 66 MG/DL (ref 40–60)
HGB BLD-MCNC: 12.5 G/DL (ref 13–17.7)
HGB UR QL STRIP.AUTO: NEGATIVE
KETONES UR QL STRIP: NEGATIVE
LDLC SERPL CALC-MCNC: 106 MG/DL (ref 0–100)
LDLC/HDLC SERPL: 1.61 {RATIO}
LEUKOCYTE ESTERASE UR QL STRIP.AUTO: NEGATIVE
LYMPHOCYTES # BLD AUTO: 1.1 10*3/MM3 (ref 0.7–3.1)
LYMPHOCYTES NFR BLD AUTO: 22.2 % (ref 19.6–45.3)
MCH RBC QN AUTO: 29.6 PG (ref 26.6–33)
MCHC RBC AUTO-ENTMCNC: 32.3 G/DL (ref 31.5–35.7)
MCV RBC AUTO: 91.6 FL (ref 79–97)
MONOCYTES # BLD AUTO: 0.1 10*3/MM3 (ref 0.1–0.9)
MONOCYTES NFR BLD AUTO: 2.8 % (ref 5–12)
NEUTROPHILS NFR BLD AUTO: 3.8 10*3/MM3 (ref 1.7–7)
NEUTROPHILS NFR BLD AUTO: 75 % (ref 42.7–76)
NITRITE UR QL STRIP: NEGATIVE
PH UR STRIP.AUTO: 5.5 [PH] (ref 4.6–8)
PLATELET # BLD AUTO: 190 10*3/MM3 (ref 140–450)
PMV BLD AUTO: 6.6 FL (ref 6–12)
POTASSIUM SERPL-SCNC: 4.7 MMOL/L (ref 3.5–5.2)
PROT SERPL-MCNC: 7.4 G/DL (ref 6–8.5)
PROT UR QL STRIP: NEGATIVE
RBC # BLD AUTO: 4.22 10*6/MM3 (ref 4.14–5.8)
RBC # UR: NORMAL /HPF
REF LAB TEST METHOD: NORMAL
SODIUM SERPL-SCNC: 141 MMOL/L (ref 136–145)
SP GR UR STRIP: >=1.03 (ref 1–1.03)
SQUAMOUS #/AREA URNS HPF: NORMAL /HPF
TRIGL SERPL-MCNC: 55 MG/DL (ref 0–150)
UROBILINOGEN UR QL STRIP: NORMAL
VLDLC SERPL-MCNC: 11 MG/DL (ref 5–40)
WBC # BLD AUTO: 5 10*3/MM3 (ref 3.4–10.8)
WBC UR QL AUTO: NORMAL /HPF

## 2020-11-16 PROCEDURE — 80053 COMPREHEN METABOLIC PANEL: CPT | Performed by: FAMILY MEDICINE

## 2020-11-16 PROCEDURE — 85025 COMPLETE CBC W/AUTO DIFF WBC: CPT | Performed by: FAMILY MEDICINE

## 2020-11-16 PROCEDURE — 80061 LIPID PANEL: CPT | Performed by: FAMILY MEDICINE

## 2020-11-16 PROCEDURE — 83036 HEMOGLOBIN GLYCOSYLATED A1C: CPT | Performed by: FAMILY MEDICINE

## 2020-11-16 PROCEDURE — G0439 PPPS, SUBSEQ VISIT: HCPCS | Performed by: FAMILY MEDICINE

## 2020-11-16 PROCEDURE — 99213 OFFICE O/P EST LOW 20 MIN: CPT | Performed by: FAMILY MEDICINE

## 2020-11-16 PROCEDURE — 81001 URINALYSIS AUTO W/SCOPE: CPT | Performed by: FAMILY MEDICINE

## 2020-11-16 PROCEDURE — 36415 COLL VENOUS BLD VENIPUNCTURE: CPT | Performed by: FAMILY MEDICINE

## 2020-11-16 PROCEDURE — 73560 X-RAY EXAM OF KNEE 1 OR 2: CPT | Performed by: FAMILY MEDICINE

## 2020-11-16 RX ORDER — CIPROFLOXACIN 500 MG/1
500 TABLET, FILM COATED ORAL 2 TIMES DAILY
COMMUNITY
Start: 2020-10-28 | End: 2021-11-08 | Stop reason: SDDI

## 2020-11-16 NOTE — PROGRESS NOTES
The ABCs of the Annual Wellness Visit  Subsequent Medicare Wellness Visit    Chief Complaint   Patient presents with   • Medicare Wellness-subsequent   • Knee Pain     bilateral   • pain in chest when stretches or turns     feels like muscle spasm maybe   • questions about how long may need Xarelto       Subjective   History of Present Illness:  Juventino Rueda is a 65 y.o. male who presents for a Subsequent Medicare Wellness Visit.    HEALTH RISK ASSESSMENT    Recent Hospitalizations:  No hospitalization(s) within the last year.    Current Medical Providers:  Patient Care Team:  Geoff Salamanca MD as PCP - General  Bam Lopez MD as Consulting Physician (Pain Medicine)  Aly Ortiz MD (Inactive) as Consulting Physician (Gastroenterology)  Shaheed Love MD as Surgeon (General Surgery)  Dheeraj Moeller MD as Consulting Physician (Urology)  Justo Barnes MD as Consulting Physician (Pulmonary Disease)  Jewell Kendrick MD as Consulting Physician (Hematology and Oncology)    Smoking Status:  Social History     Tobacco Use   Smoking Status Never Smoker   Smokeless Tobacco Never Used       Alcohol Consumption:  Social History     Substance and Sexual Activity   Alcohol Use Yes    Comment: occas       Depression Screen:   PHQ-2/PHQ-9 Depression Screening 11/16/2020   Little interest or pleasure in doing things 0   Feeling down, depressed, or hopeless 0   Trouble falling or staying asleep, or sleeping too much 0   Feeling tired or having little energy 0   Poor appetite or overeating 0   Feeling bad about yourself - or that you are a failure or have let yourself or your family down 0   Trouble concentrating on things, such as reading the newspaper or watching television 0   Moving or speaking so slowly that other people could have noticed. Or the opposite - being so fidgety or restless that you have been moving around a lot more than usual 0   Thoughts that you would be better off dead, or of  hurting yourself in some way 0   Total Score 0   If you checked off any problems, how difficult have these problems made it for you to do your work, take care of things at home, or get along with other people? Not difficult at all       Fall Risk Screen:  SHYANNE Fall Risk Assessment has not been completed.    Health Habits and Functional and Cognitive Screening:  Functional & Cognitive Status 11/16/2020   Do you have difficulty preparing food and eating? No   Do you have difficulty bathing yourself, getting dressed or grooming yourself? No   Do you have difficulty using the toilet? No   Do you have difficulty moving around from place to place? No   Do you have trouble with steps or getting out of a bed or a chair? No   Current Diet Diabetic Diet   Dental Exam Up to date   Eye Exam Up to date   Exercise (times per week) 5 times per week   Current Exercises Include Walking   Current Exercise Activities Include -   Do you need help using the phone?  No   Are you deaf or do you have serious difficulty hearing?  No   Do you need help with transportation? No   Do you need help shopping? No   Do you need help preparing meals?  No   Do you need help with housework?  No   Do you need help with laundry? No   Do you need help taking your medications? No   Do you need help managing money? No   Do you ever drive or ride in a car without wearing a seat belt? No   Have you felt unusual stress, anger or loneliness in the last month? No   Who do you live with? Spouse   If you need help, do you have trouble finding someone available to you? No   Have you been bothered in the last four weeks by sexual problems? No   Do you have difficulty concentrating, remembering or making decisions? No         Does the patient have evidence of cognitive impairment? No    Asprin use counseling:Does not need ASA (and currently is not on it)    Age-appropriate Screening Schedule:  Refer to the list below for future screening recommendations based on  patient's age, sex and/or medical conditions. Orders for these recommended tests are listed in the plan section. The patient has been provided with a written plan.    Health Maintenance   Topic Date Due   • TDAP/TD VACCINES (1 - Tdap) 04/11/1975   • ZOSTER VACCINE (1 of 2) 04/11/2006   • URINE MICROALBUMIN  05/02/2019   • DXA SCAN  09/05/2019   • DIABETIC FOOT EXAM  01/12/2020   • DIABETIC EYE EXAM  01/12/2020   • HEMOGLOBIN A1C  05/12/2020   • INFLUENZA VACCINE  08/01/2020   • COLONOSCOPY  01/16/2023          The following portions of the patient's history were reviewed and updated as appropriate: past family history and past social history.    Outpatient Medications Prior to Visit   Medication Sig Dispense Refill   • alendronate (FOSAMAX) 70 MG tablet Take 1 tablet by mouth weekly. Take 30 min before 1st meal, do not lay down 30 min after taking med, take with full glass of water. 4 tablet 11   • baclofen (LIORESAL) 10 MG tablet Take 10 mg by mouth 3 (three) times a day.     • Blood Glucose Monitoring Suppl (ACCU-CHEK LULA PLUS) w/Device kit 1 kit 4 (Four) Times a Day. 1 kit 1   • calcium carbonate (OS-JOE) 600 MG tablet Take 600 mg by mouth daily.     • Cholecalciferol (VITAMIN D3) 3000 units tablet Take 5,000 Units by mouth. Takes 1000 iu daily      • diphenhydrAMINE (BENADRYL ALLERGY) 25 mg capsule Take 25 mg by mouth Every 6 (Six) Hours As Needed for Itching.     • esomeprazole (nexIUM) 40 MG capsule Take 1 capsule by mouth Daily. 90 capsule 3   • gabapentin (NEURONTIN) 300 MG capsule Take 300 mg by mouth 2 (two) times a day.     • glimepiride (AMARYL) 1 MG tablet TAKE 1 TABLET BY MOUTH IN THE MORNING BEFORE BREAKFAST 90 tablet 0   • glucose blood (ACCU-CHEK LULA) test strip Use as instructed 50 each 12   • HYDROcodone-acetaminophen (NORCO)  MG per tablet      • Lancets Misc. (ACCU-CHEK FASTCLIX LANCET) kit 1 kit 4 (Four) Times a Day. 1 kit 1   • mometasone (ELOCON) 0.1 % cream APPLY  CREAM TOPICALLY  "TO AFFECTED AREA ONCE DAILY AS DIRECTED 15 g 0   • mupirocin (BACTROBAN) 2 % ointment      • NARCAN 4 MG/0.1ML nasal spray      • tamsulosin (FLOMAX) 0.4 MG capsule 24 hr capsule Take 1 capsule by mouth Every Night.     • VENTOLIN  (90 Base) MCG/ACT inhaler INHALE TWO PUFFS BY MOUTH EVERY 4 HOURS AS NEEDED FOR WHEEZING FOR SHORTNESS OF BREATH 1 inhaler 1   • VOLTAREN 1 % gel gel 3 (three) times a day as needed.     • XARELTO 20 MG tablet Take 1 tablet by mouth once daily 90 tablet 0   • doxycycline (MONODOX) 100 MG capsule      • ciprofloxacin (CIPRO) 500 MG tablet Take 500 mg by mouth 2 (Two) Times a Day.     • Continuous Blood Gluc Sensor (EterniamE SENSOR SYSTEM) misc 1 each 3 (Three) Times a Day. 3 each 12     No facility-administered medications prior to visit.        Patient Active Problem List   Diagnosis   • GERD (gastroesophageal reflux disease)   • Arthritis   • ESTEBAN (nonalcoholic steatohepatitis)   • Left renal mass       Advanced Care Planning:  ACP discussion was declined by the patient. Patient does not have an advance directive, information provided.    Review of Systems    Compared to one year ago, the patient feels his physical health is the same.  Compared to one year ago, the patient feels his mental health is the same.    Reviewed chart for potential of high risk medication in the elderly: yes  Reviewed chart for potential of harmful drug interactions in the elderly:yes    Objective         Vitals:    11/16/20 1526   BP: 130/70   BP Location: Left arm   Patient Position: Sitting   Pulse: 90   Resp: 20   Temp: 98.4 °F (36.9 °C)   TempSrc: Infrared   SpO2: 97%   Weight: 109 kg (241 lb)   Height: 182.9 cm (72\")       Body mass index is 32.69 kg/m².  Discussed the patient's BMI with him. The BMI is in the acceptable range.    Physical Exam          Assessment/Plan   Medicare Risks and Personalized Health Plan  CMS Preventative Services Quick Reference  Advance Directive " "Discussion  Diabetic Lab Screening   Fall Risk  Immunizations Discussed/Encouraged (specific immunizations; Influenza )    The above risks/problems have been discussed with the patient.  Pertinent information has been shared with the patient in the After Visit Summary.  Follow up plans and orders are seen below in the Assessment/Plan Section.    Diagnoses and all orders for this visit:    1. Type 2 diabetes mellitus without complication, without long-term current use of insulin (CMS/[facility]) (Primary)  -     CBC & Differential  -     Comprehensive Metabolic Panel  -     Hemoglobin A1c  -     Lipid Panel  -     Urinalysis With Microscopic - Urine, Clean Catch  -     CBC Auto Differential  -     Urinalysis without microscopic (no culture) - Urine, Clean Catch  -     Urinalysis, Microscopic Only - Urine, Clean Catch    2. Pain in both knees, unspecified chronicity  -     Cancel: XR Knee 3 View Bilateral; Future  -     XR Knee 1 or 2 View Bilateral (In Office)    3. Chest wall pain    Other orders  -     glucose blood test strip; DX E11.9 check bs daily  Dispense: 100 each; Refill: 3      Follow Up:  No follow-ups on file.     An After Visit Summary and PPPS were given to the patient.     I have reviewed the above.      SUBJECTIVE:  The patient is a 65-year-old white male.  He is here for Medicare wellness exam.  He complains of bilateral knee pain.  He has pain in his chest when he stretches.  No exertional pain.  No shortness of breath or diaphoresis.  He has peripheral vascular disease and a history of DVT.    PAST MEDICAL HISTORY:  Reviewed.    REVIEW OF SYSTEMS:  Please see above.  All others reviewed and are negative.      OBJECTIVE:   /70 (BP Location: Left arm, Patient Position: Sitting)   Pulse 90   Temp 98.4 °F (36.9 °C) (Infrared)   Resp 20   Ht 182.9 cm (72\")   Wt 109 kg (241 lb)   SpO2 97%   BMI 32.69 kg/m²    Vitals signs are reviewed and are stable.    General:  Well-nourished.  Alert and " oriented x3 in no acute distress.  HEENT: PERRLA.   Neck:  Supple.   Lungs:  Clear.    Heart:  Regular rate and rhythm.   Abdomen:   Soft, nontender.   Chest: Mild tenderness along the left sternal border.  Extremities:  No cyanosis, clubbing or edema.   Neurological:  Grossly intact without motor or sensory deficits.   X-rays of the right and left knee are done here and interpreted by me.  Indication bilateral knee pain.  None for comparison.  X-rays show degenerative changes  ASSESSMENT:      Diagnoses and all orders for this visit:    1. Type 2 diabetes mellitus without complication, without long-term current use of insulin (CMS/[facility]) (Primary)  -     CBC & Differential  -     Comprehensive Metabolic Panel  -     Hemoglobin A1c  -     Lipid Panel  -     Urinalysis With Microscopic - Urine, Clean Catch  -     CBC Auto Differential  -     Urinalysis without microscopic (no culture) - Urine, Clean Catch  -     Urinalysis, Microscopic Only - Urine, Clean Catch    2. Pain in both knees, unspecified chronicity  -     Cancel: XR Knee 3 View Bilateral; Future  -     XR Knee 1 or 2 View Bilateral (In Office)    3. Chest wall pain    Other orders  -     glucose blood test strip; DX E11.9 check bs daily  Dispense: 100 each; Refill: 3         PLAN: X-rays will be forwarded to his pain management doctor for his knee pain.  Follow-up on labs.  Apply heat when chest becomes sore with stretching.  Call if any problems.    Dictated utilizing Dragon dictation.

## 2020-12-14 RX ORDER — RIVAROXABAN 20 MG/1
TABLET, FILM COATED ORAL
Qty: 90 TABLET | Refills: 0 | Status: SHIPPED | OUTPATIENT
Start: 2020-12-14 | End: 2021-03-23

## 2021-01-02 RX ORDER — GLIMEPIRIDE 1 MG/1
TABLET ORAL
Qty: 90 TABLET | Refills: 0 | Status: SHIPPED | OUTPATIENT
Start: 2021-01-02 | End: 2021-03-24 | Stop reason: SDUPTHER

## 2021-02-26 ENCOUNTER — APPOINTMENT (OUTPATIENT)
Dept: LAB | Facility: HOSPITAL | Age: 66
End: 2021-02-26

## 2021-03-01 ENCOUNTER — OFFICE VISIT (OUTPATIENT)
Dept: FAMILY MEDICINE CLINIC | Facility: CLINIC | Age: 66
End: 2021-03-01

## 2021-03-01 VITALS
DIASTOLIC BLOOD PRESSURE: 92 MMHG | HEIGHT: 72 IN | BODY MASS INDEX: 32.15 KG/M2 | TEMPERATURE: 96.9 F | HEART RATE: 84 BPM | WEIGHT: 237.4 LBS | OXYGEN SATURATION: 90 % | SYSTOLIC BLOOD PRESSURE: 170 MMHG

## 2021-03-01 DIAGNOSIS — E11.9 TYPE 2 DIABETES MELLITUS WITHOUT COMPLICATION, WITHOUT LONG-TERM CURRENT USE OF INSULIN (HCC): Primary | ICD-10-CM

## 2021-03-01 PROCEDURE — 80061 LIPID PANEL: CPT | Performed by: FAMILY MEDICINE

## 2021-03-01 PROCEDURE — 83036 HEMOGLOBIN GLYCOSYLATED A1C: CPT | Performed by: FAMILY MEDICINE

## 2021-03-01 PROCEDURE — 99213 OFFICE O/P EST LOW 20 MIN: CPT | Performed by: FAMILY MEDICINE

## 2021-03-01 PROCEDURE — 80053 COMPREHEN METABOLIC PANEL: CPT | Performed by: FAMILY MEDICINE

## 2021-03-01 NOTE — PROGRESS NOTES
"SUBJECTIVE:  The patient is a 65-year-old male.  He has diabetes and GERD.  He is doing fairly well.  He is due lab work.    PAST MEDICAL HISTORY:  Reviewed.    REVIEW OF SYSTEMS:  Please see above.  All systems reviewed and are negative.      OBJECTIVE:   /92 (BP Location: Left arm, Patient Position: Sitting, Cuff Size: Adult)   Pulse 84   Temp 96.9 °F (36.1 °C) (Temporal)   Ht 182.9 cm (72\")   Wt 108 kg (237 lb 6.4 oz)   SpO2 90%   BMI 32.20 kg/m²    Vitals signs are reviewed and are stable.    General:  Well-nourished.  Alert and oriented x3 in no acute distress.  HEENT: PERRLA.   Neck:  Supple.   Lungs:  Clear.    Heart:  Regular rate and rhythm.   Abdomen:   Soft, nontender.   Extremities:  No cyanosis, clubbing or edema.   Neurological:  Grossly intact without motor or sensory deficits.     ASSESSMENT:      Diagnoses and all orders for this visit:    1. Type 2 diabetes mellitus without complication, without long-term current use of insulin (CMS/[facility]) (Primary)  -     Comprehensive Metabolic Panel  -     Hemoglobin A1c  -     Lipid Panel  -     MicroAlbumin, Urine, Random - Urine, Clean Catch         PLAN: See above.  Healthy lifestyle discussed.  Follow-up on labs.  Call if problems.    Dictated utilizing Dragon dictation.    "

## 2021-03-08 ENCOUNTER — OFFICE VISIT (OUTPATIENT)
Dept: ONCOLOGY | Facility: CLINIC | Age: 66
End: 2021-03-08

## 2021-03-08 ENCOUNTER — LAB (OUTPATIENT)
Dept: LAB | Facility: HOSPITAL | Age: 66
End: 2021-03-08

## 2021-03-08 VITALS
OXYGEN SATURATION: 92 % | HEIGHT: 72 IN | DIASTOLIC BLOOD PRESSURE: 80 MMHG | HEART RATE: 87 BPM | TEMPERATURE: 98.2 F | WEIGHT: 236.1 LBS | SYSTOLIC BLOOD PRESSURE: 149 MMHG | RESPIRATION RATE: 18 BRPM | BODY MASS INDEX: 31.98 KG/M2

## 2021-03-08 DIAGNOSIS — D70.9 NEUTROPENIA, UNSPECIFIED TYPE (HCC): Primary | ICD-10-CM

## 2021-03-08 DIAGNOSIS — D64.9 NORMOCHROMIC NORMOCYTIC ANEMIA: ICD-10-CM

## 2021-03-08 DIAGNOSIS — D69.6 THROMBOCYTOPENIA (HCC): ICD-10-CM

## 2021-03-08 DIAGNOSIS — D70.9 NEUTROPENIA, UNSPECIFIED TYPE (HCC): ICD-10-CM

## 2021-03-08 LAB
BASOPHILS # BLD AUTO: 0.02 10*3/MM3 (ref 0–0.2)
BASOPHILS NFR BLD AUTO: 0.6 % (ref 0–1.5)
DEPRECATED RDW RBC AUTO: 43.6 FL (ref 37–54)
EOSINOPHIL # BLD AUTO: 0.14 10*3/MM3 (ref 0–0.4)
EOSINOPHIL NFR BLD AUTO: 4.2 % (ref 0.3–6.2)
ERYTHROCYTE [DISTWIDTH] IN BLOOD BY AUTOMATED COUNT: 12.5 % (ref 12.3–15.4)
FERRITIN SERPL-MCNC: 75.9 NG/ML (ref 30–400)
HCT VFR BLD AUTO: 37.7 % (ref 37.5–51)
HGB BLD-MCNC: 12 G/DL (ref 13–17.7)
IMM GRANULOCYTES # BLD AUTO: 0 10*3/MM3 (ref 0–0.05)
IMM GRANULOCYTES NFR BLD AUTO: 0 % (ref 0–0.5)
IRON 24H UR-MRATE: 66 MCG/DL (ref 59–158)
IRON SATN MFR SERPL: 17 % (ref 14–48)
LYMPHOCYTES # BLD AUTO: 0.94 10*3/MM3 (ref 0.7–3.1)
LYMPHOCYTES NFR BLD AUTO: 27.9 % (ref 19.6–45.3)
MCH RBC QN AUTO: 30.1 PG (ref 26.6–33)
MCHC RBC AUTO-ENTMCNC: 31.8 G/DL (ref 31.5–35.7)
MCV RBC AUTO: 94.5 FL (ref 79–97)
MONOCYTES # BLD AUTO: 0.38 10*3/MM3 (ref 0.1–0.9)
MONOCYTES NFR BLD AUTO: 11.3 % (ref 5–12)
NEUTROPHILS NFR BLD AUTO: 1.89 10*3/MM3 (ref 1.7–7)
NEUTROPHILS NFR BLD AUTO: 56 % (ref 42.7–76)
NRBC BLD AUTO-RTO: 0 /100 WBC (ref 0–0.2)
PLATELET # BLD AUTO: 113 10*3/MM3 (ref 140–450)
PMV BLD AUTO: 8.3 FL (ref 6–12)
RBC # BLD AUTO: 3.99 10*6/MM3 (ref 4.14–5.8)
TIBC SERPL-MCNC: 386 MCG/DL (ref 249–505)
TRANSFERRIN SERPL-MCNC: 276 MG/DL (ref 200–360)
WBC # BLD AUTO: 3.37 10*3/MM3 (ref 3.4–10.8)

## 2021-03-08 PROCEDURE — 99213 OFFICE O/P EST LOW 20 MIN: CPT | Performed by: INTERNAL MEDICINE

## 2021-03-08 PROCEDURE — 82728 ASSAY OF FERRITIN: CPT

## 2021-03-08 PROCEDURE — 36415 COLL VENOUS BLD VENIPUNCTURE: CPT

## 2021-03-08 PROCEDURE — 85025 COMPLETE CBC W/AUTO DIFF WBC: CPT

## 2021-03-08 PROCEDURE — 83540 ASSAY OF IRON: CPT

## 2021-03-08 PROCEDURE — 84466 ASSAY OF TRANSFERRIN: CPT

## 2021-03-08 NOTE — PROGRESS NOTES
Subjective     CHIEF COMPLAINT:      Chief Complaint   Patient presents with   • Follow-up       HISTORY OF PRESENT ILLNESS:     Juventino Rueda is a 65 y.o. male patient who returns today for follow up on his pancytopenia.  He returns today for follow-up reporting no new symptoms.  He has paralyzed left hemidiaphragm and has oxygen saturation that is usually in the 89-92%.  He reports intermittent pain in the left lower chest that improved with application of Voltaren cream.    Patient is working on losing weight.  He has stopped drinking soft drinks few months ago.  He does not drink alcohol.    Past medical, surgical, social and family history reviewed.     MEDICATIONS:    Current Outpatient Medications:   •  alendronate (FOSAMAX) 70 MG tablet, Take 1 tablet by mouth weekly. Take 30 min before 1st meal, do not lay down 30 min after taking med, take with full glass of water., Disp: 4 tablet, Rfl: 11  •  baclofen (LIORESAL) 10 MG tablet, Take 10 mg by mouth 3 (three) times a day., Disp: , Rfl:   •  Blood Glucose Monitoring Suppl (ACCU-CHEK LULA PLUS) w/Device kit, 1 kit 4 (Four) Times a Day., Disp: 1 kit, Rfl: 1  •  calcium carbonate (OS-JOE) 600 MG tablet, Take 600 mg by mouth daily., Disp: , Rfl:   •  Cholecalciferol (VITAMIN D3) 3000 units tablet, Take 5,000 Units by mouth. Takes 1000 iu daily , Disp: , Rfl:   •  ciprofloxacin (CIPRO) 500 MG tablet, Take 500 mg by mouth 2 (Two) Times a Day., Disp: , Rfl:   •  Continuous Blood Gluc Sensor (FREESTYLE ELIJAH SENSOR SYSTEM) misc, 1 each 3 (Three) Times a Day., Disp: 3 each, Rfl: 12  •  Diclofenac Sodium (VOLTAREN) 1 % gel gel, APPLY EXTERNALLY TO AFFECTED AREA THREE TIMES DAILY, Disp: , Rfl:   •  diphenhydrAMINE (BENADRYL ALLERGY) 25 mg capsule, Take 25 mg by mouth Every 6 (Six) Hours As Needed for Itching., Disp: , Rfl:   •  esomeprazole (nexIUM) 40 MG capsule, Take 1 capsule by mouth Daily., Disp: 90 capsule, Rfl: 3  •  gabapentin (NEURONTIN) 300 MG capsule, Take  "300 mg by mouth 2 (two) times a day., Disp: , Rfl:   •  glimepiride (AMARYL) 1 MG tablet, TAKE 1 TABLET BY MOUTH IN THE MORNING BEFORE BREAKFAST, Disp: 90 tablet, Rfl: 0  •  glucose blood (ACCU-CHEK LULA) test strip, Use as instructed, Disp: 50 each, Rfl: 12  •  glucose blood test strip, DX E11.9 check bs daily, Disp: 100 each, Rfl: 3  •  HYDROcodone-acetaminophen (NORCO)  MG per tablet, , Disp: , Rfl:   •  Lancets Misc. (ACCU-CHEK FASTCLIX LANCET) kit, 1 kit 4 (Four) Times a Day., Disp: 1 kit, Rfl: 1  •  mometasone (ELOCON) 0.1 % cream, APPLY  CREAM TOPICALLY TO AFFECTED AREA ONCE DAILY AS DIRECTED, Disp: 15 g, Rfl: 0  •  mupirocin (BACTROBAN) 2 % ointment, , Disp: , Rfl:   •  NARCAN 4 MG/0.1ML nasal spray, , Disp: , Rfl:   •  tamsulosin (FLOMAX) 0.4 MG capsule 24 hr capsule, Take 1 capsule by mouth Every Night., Disp: , Rfl:   •  VENTOLIN  (90 Base) MCG/ACT inhaler, INHALE TWO PUFFS BY MOUTH EVERY 4 HOURS AS NEEDED FOR WHEEZING FOR SHORTNESS OF BREATH, Disp: 1 inhaler, Rfl: 1  •  VOLTAREN 1 % gel gel, 3 (three) times a day as needed., Disp: , Rfl:   •  Xarelto 20 MG tablet, Take 1 tablet by mouth once daily, Disp: 90 tablet, Rfl: 0    Objective   VITAL SIGNS:     Vitals:    03/08/21 1620 03/08/21 1702   BP: 149/80    Pulse: 87    Resp: 18    Temp: 98.2 °F (36.8 °C)    TempSrc: Infrared    SpO2: (!) 89% 92%   Weight: 107 kg (236 lb 1.6 oz)    Height: 182.9 cm (72.01\")    PainSc: 0-No pain      Body mass index is 32.01 kg/m².     Wt Readings from Last 5 Encounters:   03/08/21 107 kg (236 lb 1.6 oz)   03/01/21 108 kg (237 lb 6.4 oz)   11/16/20 109 kg (241 lb)   09/11/20 107 kg (236 lb 6.4 oz)   01/09/20 112 kg (247 lb 3.2 oz)       PHYSICAL EXAMINATION:   GENERAL: The patient appears in good general condition, not in acute distress.   SKIN: No ecchymosis.  EYES: No jaundice.  LYMPHATICS: No cervical, supraclavicular or axillary lymphadenopathy.  CHEST: Normal respiratory effort.   ABDOMEN: Obese.  Soft. " No tenderness. No Hepatomegaly. No Splenomegaly. No masses.  Exam is limited by body habitus.    DIAGNOSTIC DATA:     Results from last 7 days   Lab Units 03/08/21  1603   WBC 10*3/mm3 3.37*   NEUTROS ABS 10*3/mm3 1.89   HEMOGLOBIN g/dL 12.0*   HEMATOCRIT % 37.7   PLATELETS 10*3/mm3 113*     Component      Latest Ref Rng & Units 1/9/2020 9/11/2020 3/8/2021   Iron      59 - 158 mcg/dL 80 59 66   Iron Saturation      14 - 48 % 19 16 17   Transferrin      200 - 360 mg/dL 294 265 276   TIBC      249 - 505 mcg/dL 412 371 386   Methylmalonic Acid      0 - 378 nmol/L 266     DISCLAIMER       Comment     Vitamin B-12      211 - 946 pg/mL 1,194 (H) >2,000 (H)    Folate      4.78 - 24.20 ng/mL 15.80     Ferritin      30.00 - 400.00 ng/mL 140.90 113.10 75.90       Component      Latest Ref Rng & Units 9/11/2020   IgG      603 - 1613 mg/dL 1069   IgA      61 - 437 mg/dL 334   IgM      20 - 172 mg/dL 154   Total Protein      6.0 - 8.5 g/dL 6.7   Albumin      2.9 - 4.4 g/dL 3.7   Alpha-1-Globulin      0.0 - 0.4 g/dL 0.3   Alpha-2-Globulin      0.4 - 1.0 g/dL 0.7   Beta Globulin      0.7 - 1.3 g/dL 0.9   Gamma Globulin      0.4 - 1.8 g/dL 1.1   M-Te      Not Observed g/dL Not Observed   Globulin      2.2 - 3.9 g/dL 3.0   A/G Ratio      0.7 - 1.7 1.3   Immunofixation Reflex, Serum       Comment   Please note       Comment   Kappa FLC      3.3 - 19.4 mg/L 29.4 (H)   Free Lambda Light Chains      5.7 - 26.3 mg/L 16.6   Kappa/Lambda Ratio      0.26 - 1.65 1.77 (H)         Assessment/Plan   1.  Leukopenia with neutropenia.    · This was identified on labs in 2019.    · The lowest ANC was 1070 on 3/26/2019.    · Neutrophil count normalized on 1/9/2020 at 2200.    · There was no evidence of vitamin B12 or folate deficiency explain this.    · Labs from 9/11/2020 revealed a low normal WBC.  Neutrophils were 1,990.  · Labs from today revealed a WBC count of 3370 with a neutrophil count of 1,890.  · Patient is not having problems with  infections.  · Patient has mild thrombocytopenia.  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  · We discussed the diagnosis of ESTEBAN.  He was diagnosed by Dr. Ortiz.  We discussed the need to continue to lose weight and watch intake of fatty and sugary food items.  · I explained to the patient that blood counts are expected to improve if his underlying liver disease (ESTEBAN) improves.    2.  Normochromic normocytic anemia.    · Patient reported fatigue on 1/9/2020.    · We obtained iron studies and there was no evidence of deficiency.    · Repeat iron studies from 9/11/2020 remain normal.  Patient has multiple underlying medical illnesses.  · The anemia is likely secondary to anemia of chronic disease.    · SPEP HAI FLC showed no evidence of monoclonal protein.    · Hemoglobin decreased to 12.0 today.  No evidence of iron deficiency.  He has adequate vitamin B12 stores.  He has been taking vitamin B12 daily..    PLAN:    1.  Reduce vitamin B12 to weekly.  2.  Follow-up in 8 months with repeat CBC ferritin iron panel B12 and folate.  3.  We discussed the need to increase activity and lose weight.        Jewell Kendrick MD  03/08/21

## 2021-03-18 ENCOUNTER — TELEPHONE (OUTPATIENT)
Dept: FAMILY MEDICINE CLINIC | Facility: CLINIC | Age: 66
End: 2021-03-18

## 2021-03-18 RX ORDER — BUSPIRONE HYDROCHLORIDE 5 MG/1
5 TABLET ORAL 3 TIMES DAILY
Qty: 90 TABLET | Refills: 3 | Status: SHIPPED | OUTPATIENT
Start: 2021-03-18 | End: 2021-09-05

## 2021-03-18 NOTE — TELEPHONE ENCOUNTER
PATIENT CALLED IN REQUESTING A CALL BACK FROM THE NURSE REGARDING HIS ACCU CHECK CONTINUES TO SHOW AN ERROR 1.    BEST CALL BACK # [phone]

## 2021-03-18 NOTE — TELEPHONE ENCOUNTER
Patient wants to know if there is something that would help with his anxiety just to take the edge off he doesn't want to be drowsy but taking care of the little ones has him on edge

## 2021-03-23 RX ORDER — RIVAROXABAN 20 MG/1
TABLET, FILM COATED ORAL
Qty: 90 TABLET | Refills: 0 | Status: SHIPPED | OUTPATIENT
Start: 2021-03-23 | End: 2021-03-24 | Stop reason: SDUPTHER

## 2021-03-24 RX ORDER — GLIMEPIRIDE 1 MG/1
1 TABLET ORAL
Qty: 90 TABLET | Refills: 0 | Status: CANCELLED | OUTPATIENT
Start: 2021-03-24

## 2021-03-24 RX ORDER — GLIMEPIRIDE 1 MG/1
1 TABLET ORAL
Qty: 90 TABLET | Refills: 0 | Status: SHIPPED | OUTPATIENT
Start: 2021-03-24 | End: 2021-03-31

## 2021-03-24 NOTE — TELEPHONE ENCOUNTER
Caller: [id number] - [place], [place] - [address] AT [facility] [phone] [place] [phone] FX    Relationship: Pharmacy    Best call back number: [phone]    Medication needed:   Requested Prescriptions     Pending Prescriptions Disp Refills   • glimepiride (AMARYL) 1 MG tablet 90 tablet 0     Sig: Take 1 tablet by mouth Every Morning Before Breakfast.   • rivaroxaban (Xarelto) 20 MG tablet 90 tablet 0     Sig: Take 1 tablet by mouth Daily.       When do you need the refill by: 03/24 ASAP    What additional details did the patient provide when requesting the medication: PATIENT SWITCHED TO [facility] FROM [facility] BUT THESE TWO SCRIPTS HAVE NO REFILLS. PLEASE SEND OVER NEW SCRIPTS ASAP.     Does the patient have less than a 3 day supply:  [x] Yes  [] No    What is the patient's preferred pharmacy: [facility] #[id number] - [place], [place] - [address] AT [facility] [phone] [facility] [phone] FX

## 2021-03-24 NOTE — TELEPHONE ENCOUNTER
PATIENT NEEDS REFILL ON:glimepiride (AMARYL) 1 MG tablet  Xarelto 20 MG tablet     PATIENT CAN BE REACHED ON: [phone]    PHARMACY PREFERRED:[facility] #[id number] - [place] [id number] AT [facility] [address] - [phone]  - [phone]   [phone]

## 2021-03-31 RX ORDER — GLIMEPIRIDE 1 MG/1
TABLET ORAL
Qty: 90 TABLET | Refills: 0 | Status: SHIPPED | OUTPATIENT
Start: 2021-03-31 | End: 2021-06-29

## 2021-06-09 ENCOUNTER — TELEPHONE (OUTPATIENT)
Dept: FAMILY MEDICINE CLINIC | Facility: CLINIC | Age: 66
End: 2021-06-09

## 2021-06-09 NOTE — TELEPHONE ENCOUNTER
Caller: Juventino Rueda    Relationship to patient: Self    Best call back number: [phone]    Patient is needing: PATIENT IS CALLING IN REGARDS TO A CALLER FROM [employer] ABOUT APPROVAL FOR A BACK BRACE. HE STATED THAT THE GENTLEMAN HE TALKED TO SAID THAT MD FRENCH SIGNED OFF ON THE ORDERS FOR THE BACK BRACE AND PATIENT IS VERY CONFUSED SINCE HE HAD NEVER SPOKEN TO MD FRENCH ABOUT NEEDING ONE. PATIENT WOULD LIKE TO BE CONTACTED TO SEE WHAT IS GOING ON.

## 2021-06-29 RX ORDER — GLIMEPIRIDE 1 MG/1
TABLET ORAL
Qty: 90 TABLET | Refills: 0 | Status: SHIPPED | OUTPATIENT
Start: 2021-06-29 | End: 2021-09-30

## 2021-07-14 ENCOUNTER — OFFICE VISIT (OUTPATIENT)
Dept: FAMILY MEDICINE CLINIC | Facility: CLINIC | Age: 66
End: 2021-07-14

## 2021-07-14 VITALS
DIASTOLIC BLOOD PRESSURE: 70 MMHG | TEMPERATURE: 99.3 F | WEIGHT: 230 LBS | SYSTOLIC BLOOD PRESSURE: 146 MMHG | HEART RATE: 86 BPM | BODY MASS INDEX: 31.15 KG/M2 | OXYGEN SATURATION: 95 % | HEIGHT: 72 IN

## 2021-07-14 DIAGNOSIS — E11.9 TYPE 2 DIABETES MELLITUS WITHOUT COMPLICATION, WITHOUT LONG-TERM CURRENT USE OF INSULIN (HCC): ICD-10-CM

## 2021-07-14 DIAGNOSIS — R53.83 FATIGUE, UNSPECIFIED TYPE: Primary | ICD-10-CM

## 2021-07-14 PROCEDURE — 84443 ASSAY THYROID STIM HORMONE: CPT | Performed by: FAMILY MEDICINE

## 2021-07-14 PROCEDURE — 80053 COMPREHEN METABOLIC PANEL: CPT | Performed by: FAMILY MEDICINE

## 2021-07-14 PROCEDURE — 83036 HEMOGLOBIN GLYCOSYLATED A1C: CPT | Performed by: FAMILY MEDICINE

## 2021-07-14 PROCEDURE — 80061 LIPID PANEL: CPT | Performed by: FAMILY MEDICINE

## 2021-07-14 PROCEDURE — 84403 ASSAY OF TOTAL TESTOSTERONE: CPT | Performed by: FAMILY MEDICINE

## 2021-07-14 PROCEDURE — 36415 COLL VENOUS BLD VENIPUNCTURE: CPT | Performed by: FAMILY MEDICINE

## 2021-07-14 PROCEDURE — 99213 OFFICE O/P EST LOW 20 MIN: CPT | Performed by: FAMILY MEDICINE

## 2021-07-14 NOTE — PROGRESS NOTES
"SUBJECTIVE:  The patient is a 66-year-old white male.  He presents today primarily because of fatigue.  He has diabetes and arthritis.  He has hypertension.  He has a history of DVT and PVD.  He is O2 dependent at night.  He has had a pneumonectomy on the left.  He denies chest pain.  He denies fever chills.  He has melena hematochezia.  He states his blood sugars are well controlled.  PAST MEDICAL HISTORY:  Reviewed.    REVIEW OF SYSTEMS:  Please see above.  All others reviewed and are negative.      OBJECTIVE:   /70 (BP Location: Left arm, Patient Position: Sitting, Cuff Size: Adult)   Pulse 86   Temp 99.3 °F (37.4 °C) (Temporal)   Ht 182.9 cm (72\")   Wt 104 kg (230 lb)   SpO2 95% Comment: as soon as oxygen started @ 2lpm  BMI 31.19 kg/m²    Vitals signs are reviewed and are stable.    General:  Well-nourished.  Alert and oriented x3 in no acute distress.  HEENT: PERRLA.   Neck:  Supple.   Lungs:  Clear.  Breath sounds absent on the left.  Heart:  Regular rate and rhythm.   Abdomen:   Soft, nontender.   Extremities:  No cyanosis, clubbing or edema.   Neurological:  Grossly intact without motor or sensory deficits.     ASSESSMENT:      Diagnoses and all orders for this visit:    1. Fatigue, unspecified type (Primary)  -     Comprehensive Metabolic Panel  -     Hemoglobin A1c  -     Lipid Panel  -     TSH  -     Testosterone    2. Type 2 diabetes mellitus without complication, without long-term current use of insulin (CMS/[facility])  -     Comprehensive Metabolic Panel  -     Hemoglobin A1c  -     Lipid Panel  -     TSH  -     Testosterone         PLAN: See above orders.  Follow-up on labs.  Call if problems.    Dictated utilizing Dragon dictation.    "

## 2021-07-15 LAB
ALBUMIN SERPL-MCNC: 3.6 G/DL (ref 3.5–5.2)
ALBUMIN/GLOB SERPL: 1.2 G/DL
ALP SERPL-CCNC: 65 U/L (ref 39–117)
ALT SERPL W P-5'-P-CCNC: 14 U/L (ref 1–41)
ANION GAP SERPL CALCULATED.3IONS-SCNC: 3.9 MMOL/L (ref 5–15)
AST SERPL-CCNC: 20 U/L (ref 1–40)
BILIRUB SERPL-MCNC: 0.5 MG/DL (ref 0–1.2)
BUN SERPL-MCNC: 15 MG/DL (ref 8–23)
BUN/CREAT SERPL: 15.5 (ref 7–25)
CALCIUM SPEC-SCNC: 9.2 MG/DL (ref 8.6–10.5)
CHLORIDE SERPL-SCNC: 100 MMOL/L (ref 98–107)
CHOLEST SERPL-MCNC: 145 MG/DL (ref 0–200)
CO2 SERPL-SCNC: 39.1 MMOL/L (ref 22–29)
CREAT SERPL-MCNC: 0.97 MG/DL (ref 0.76–1.27)
GFR SERPL CREATININE-BSD FRML MDRD: 78 ML/MIN/1.73
GLOBULIN UR ELPH-MCNC: 2.9 GM/DL
GLUCOSE SERPL-MCNC: 107 MG/DL (ref 65–99)
HBA1C MFR BLD: 6 % (ref 4.8–5.6)
HDLC SERPL-MCNC: 43 MG/DL (ref 40–60)
LDLC SERPL CALC-MCNC: 91 MG/DL (ref 0–100)
LDLC/HDLC SERPL: 2.13 {RATIO}
POTASSIUM SERPL-SCNC: 5.4 MMOL/L (ref 3.5–5.2)
PROT SERPL-MCNC: 6.5 G/DL (ref 6–8.5)
SODIUM SERPL-SCNC: 143 MMOL/L (ref 136–145)
TESTOST SERPL-MCNC: 43.7 NG/DL (ref 193–740)
TRIGL SERPL-MCNC: 53 MG/DL (ref 0–150)
TSH SERPL DL<=0.05 MIU/L-ACNC: 0.67 UIU/ML (ref 0.27–4.2)
VLDLC SERPL-MCNC: 11 MG/DL (ref 5–40)

## 2021-07-17 DIAGNOSIS — R79.89 LOW TESTOSTERONE: Primary | ICD-10-CM

## 2021-07-17 DIAGNOSIS — E87.5 HYPERKALEMIA: ICD-10-CM

## 2021-07-19 ENCOUNTER — LAB (OUTPATIENT)
Dept: FAMILY MEDICINE CLINIC | Facility: CLINIC | Age: 66
End: 2021-07-19

## 2021-07-19 LAB
ANION GAP SERPL CALCULATED.3IONS-SCNC: 5.1 MMOL/L (ref 5–15)
BUN SERPL-MCNC: 13 MG/DL (ref 8–23)
BUN/CREAT SERPL: 16.5 (ref 7–25)
CALCIUM SPEC-SCNC: 9.7 MG/DL (ref 8.6–10.5)
CHLORIDE SERPL-SCNC: 99 MMOL/L (ref 98–107)
CO2 SERPL-SCNC: 36.9 MMOL/L (ref 22–29)
CREAT SERPL-MCNC: 0.79 MG/DL (ref 0.76–1.27)
GFR SERPL CREATININE-BSD FRML MDRD: 98 ML/MIN/1.73
GLUCOSE SERPL-MCNC: 111 MG/DL (ref 65–99)
POTASSIUM SERPL-SCNC: 5.6 MMOL/L (ref 3.5–5.2)
SODIUM SERPL-SCNC: 141 MMOL/L (ref 136–145)

## 2021-07-19 PROCEDURE — 80048 BASIC METABOLIC PNL TOTAL CA: CPT | Performed by: FAMILY MEDICINE

## 2021-07-19 PROCEDURE — 36415 COLL VENOUS BLD VENIPUNCTURE: CPT | Performed by: FAMILY MEDICINE

## 2021-07-20 DIAGNOSIS — E87.5 HYPERKALEMIA: Primary | ICD-10-CM

## 2021-08-24 RX ORDER — TAMSULOSIN HYDROCHLORIDE 0.4 MG/1
1 CAPSULE ORAL NIGHTLY
Qty: 30 CAPSULE | Refills: 1 | Status: SHIPPED | OUTPATIENT
Start: 2021-08-24 | End: 2021-08-24

## 2021-08-24 RX ORDER — TAMSULOSIN HYDROCHLORIDE 0.4 MG/1
1 CAPSULE ORAL NIGHTLY
Qty: 90 CAPSULE | Refills: 1 | Status: SHIPPED | OUTPATIENT
Start: 2021-08-24 | End: 2021-12-28

## 2021-08-24 NOTE — TELEPHONE ENCOUNTER
Caller: Juventino Rueda    Relationship: Self    Best call back number: [phone]    Medication needed:   Requested Prescriptions     Pending Prescriptions Disp Refills   • tamsulosin (FLOMAX) 0.4 MG capsule 24 hr capsule 30 capsule      Sig: Take 1 capsule by mouth Every Night.       When do you need the refill by: ASAP    What additional details did the patient provide when requesting the medication: PATIENT CALLING IN REGARDS TO REQUEST THE GENERIC FOR NEXIUM. AND REQUEST A REFILL FOR TAMSULOSIN. PLEASE ADVISE, THANK You!    Does the patient have less than a 3 day supply:  [] Yes  [x] No    What is the patient's preferred pharmacy: [id number] - [facility] [address] AT [facility] & [facility] [phone] [facility] [phone]

## 2021-09-05 RX ORDER — BUSPIRONE HYDROCHLORIDE 5 MG/1
TABLET ORAL
Qty: 90 TABLET | Refills: 3 | Status: SHIPPED | OUTPATIENT
Start: 2021-09-05 | End: 2022-01-17 | Stop reason: SDUPTHER

## 2021-09-30 RX ORDER — GLIMEPIRIDE 1 MG/1
TABLET ORAL
Qty: 90 TABLET | Refills: 0 | Status: SHIPPED | OUTPATIENT
Start: 2021-09-30 | End: 2022-01-03 | Stop reason: SDUPTHER

## 2021-11-08 ENCOUNTER — OFFICE VISIT (OUTPATIENT)
Dept: ONCOLOGY | Facility: CLINIC | Age: 66
End: 2021-11-08

## 2021-11-08 ENCOUNTER — LAB (OUTPATIENT)
Dept: LAB | Facility: HOSPITAL | Age: 66
End: 2021-11-08

## 2021-11-08 ENCOUNTER — APPOINTMENT (OUTPATIENT)
Dept: LAB | Facility: HOSPITAL | Age: 66
End: 2021-11-08

## 2021-11-08 VITALS
OXYGEN SATURATION: 94 % | HEART RATE: 81 BPM | RESPIRATION RATE: 16 BRPM | WEIGHT: 223.9 LBS | SYSTOLIC BLOOD PRESSURE: 150 MMHG | TEMPERATURE: 97.5 F | BODY MASS INDEX: 30.33 KG/M2 | HEIGHT: 72 IN | DIASTOLIC BLOOD PRESSURE: 78 MMHG

## 2021-11-08 DIAGNOSIS — D69.6 THROMBOCYTOPENIA (HCC): ICD-10-CM

## 2021-11-08 DIAGNOSIS — D64.9 NORMOCHROMIC NORMOCYTIC ANEMIA: ICD-10-CM

## 2021-11-08 DIAGNOSIS — D70.9 NEUTROPENIA, UNSPECIFIED TYPE (HCC): ICD-10-CM

## 2021-11-08 DIAGNOSIS — D70.9 NEUTROPENIA, UNSPECIFIED TYPE (HCC): Primary | ICD-10-CM

## 2021-11-08 LAB
BASOPHILS # BLD AUTO: 0.03 10*3/MM3 (ref 0–0.2)
BASOPHILS NFR BLD AUTO: 1 % (ref 0–1.5)
DEPRECATED RDW RBC AUTO: 45.2 FL (ref 37–54)
EOSINOPHIL # BLD AUTO: 0.12 10*3/MM3 (ref 0–0.4)
EOSINOPHIL NFR BLD AUTO: 4.1 % (ref 0.3–6.2)
ERYTHROCYTE [DISTWIDTH] IN BLOOD BY AUTOMATED COUNT: 13 % (ref 12.3–15.4)
FERRITIN SERPL-MCNC: 87.9 NG/ML (ref 30–400)
FOLATE SERPL-MCNC: 18.3 NG/ML (ref 4.78–24.2)
HCT VFR BLD AUTO: 37.9 % (ref 37.5–51)
HGB BLD-MCNC: 12 G/DL (ref 13–17.7)
IMM GRANULOCYTES # BLD AUTO: 0.01 10*3/MM3 (ref 0–0.05)
IMM GRANULOCYTES NFR BLD AUTO: 0.3 % (ref 0–0.5)
IRON 24H UR-MRATE: 80 MCG/DL (ref 59–158)
IRON SATN MFR SERPL: 19 % (ref 20–50)
LYMPHOCYTES # BLD AUTO: 0.71 10*3/MM3 (ref 0.7–3.1)
LYMPHOCYTES NFR BLD AUTO: 24.5 % (ref 19.6–45.3)
MCH RBC QN AUTO: 30.2 PG (ref 26.6–33)
MCHC RBC AUTO-ENTMCNC: 31.7 G/DL (ref 31.5–35.7)
MCV RBC AUTO: 95.5 FL (ref 79–97)
MONOCYTES # BLD AUTO: 0.25 10*3/MM3 (ref 0.1–0.9)
MONOCYTES NFR BLD AUTO: 8.6 % (ref 5–12)
NEUTROPHILS NFR BLD AUTO: 1.78 10*3/MM3 (ref 1.7–7)
NEUTROPHILS NFR BLD AUTO: 61.5 % (ref 42.7–76)
NRBC BLD AUTO-RTO: 0 /100 WBC (ref 0–0.2)
PLATELET # BLD AUTO: 116 10*3/MM3 (ref 140–450)
PMV BLD AUTO: 8.7 FL (ref 6–12)
RBC # BLD AUTO: 3.97 10*6/MM3 (ref 4.14–5.8)
TIBC SERPL-MCNC: 411 MCG/DL (ref 298–536)
TRANSFERRIN SERPL-MCNC: 276 MG/DL (ref 200–360)
VIT B12 BLD-MCNC: >2000 PG/ML (ref 211–946)
WBC # BLD AUTO: 2.9 10*3/MM3 (ref 3.4–10.8)

## 2021-11-08 PROCEDURE — 99214 OFFICE O/P EST MOD 30 MIN: CPT | Performed by: INTERNAL MEDICINE

## 2021-11-08 PROCEDURE — 84466 ASSAY OF TRANSFERRIN: CPT | Performed by: INTERNAL MEDICINE

## 2021-11-08 PROCEDURE — 83540 ASSAY OF IRON: CPT | Performed by: INTERNAL MEDICINE

## 2021-11-08 PROCEDURE — 85025 COMPLETE CBC W/AUTO DIFF WBC: CPT

## 2021-11-08 PROCEDURE — 36415 COLL VENOUS BLD VENIPUNCTURE: CPT

## 2021-11-08 PROCEDURE — 82746 ASSAY OF FOLIC ACID SERUM: CPT | Performed by: INTERNAL MEDICINE

## 2021-11-08 PROCEDURE — 82728 ASSAY OF FERRITIN: CPT | Performed by: INTERNAL MEDICINE

## 2021-11-08 PROCEDURE — 82607 VITAMIN B-12: CPT | Performed by: INTERNAL MEDICINE

## 2021-11-08 RX ORDER — LANOLIN ALCOHOL/MO/W.PET/CERES
1000 CREAM (GRAM) TOPICAL WEEKLY
Start: 2021-11-08 | End: 2022-08-29 | Stop reason: SDUPTHER

## 2021-11-08 NOTE — PROGRESS NOTES
Subjective     CHIEF COMPLAINT:      Chief Complaint   Patient presents with   • Follow-up     no concerns       HISTORY OF PRESENT ILLNESS:     Juventino Rueda is a 66 y.o. male patient who returns today for follow up on his anemia and thrombocytopenia.  He returns today for follow-up reporting continued problem with fatigue.  He is taking care of of 2 twin grandchildren who are 9-month-old and this is taking a lot of his energy.    Patient was hospitalized at [facility] with left leg diabetic ulcer that required debridement.  He was placed on IV antibiotics.  He was discharged on Cipro.  He continues to follow-up with the wound clinic.    Patient is taking vitamin B12 daily.    ROS:  Pertinent ROS is in the HPI.     Past medical, surgical, social and family history were reviewed.     MEDICATIONS:    Current Outpatient Medications:   •  alendronate (FOSAMAX) 70 MG tablet, Take 1 tablet by mouth weekly. Take 30 min before 1st meal, do not lay down 30 min after taking med, take with full glass of water., Disp: 4 tablet, Rfl: 11  •  baclofen (LIORESAL) 10 MG tablet, Take 10 mg by mouth 3 (three) times a day., Disp: , Rfl:   •  Blood Glucose Monitoring Suppl (ACCU-CHEK LULA PLUS) w/Device kit, 1 kit 4 (Four) Times a Day., Disp: 1 kit, Rfl: 1  •  busPIRone (BUSPAR) 5 MG tablet, TAKE 1 TABLET BY MOUTH THREE TIMES DAILY, Disp: 90 tablet, Rfl: 3  •  calcium carbonate (OS-JOE) 600 MG tablet, Take 600 mg by mouth daily., Disp: , Rfl:   •  Cholecalciferol (VITAMIN D3) 3000 units tablet, Take 5,000 Units by mouth. Takes 1000 iu daily , Disp: , Rfl:   •  Continuous Blood Gluc Sensor (FREESTYLE ELIJAH SENSOR SYSTEM) misc, 1 each 3 (Three) Times a Day., Disp: 3 each, Rfl: 12  •  Diclofenac Sodium (VOLTAREN) 1 % gel gel, APPLY EXTERNALLY TO AFFECTED AREA THREE TIMES DAILY, Disp: , Rfl:   •  diphenhydrAMINE (BENADRYL ALLERGY) 25 mg capsule, Take 25 mg by mouth Every 6 (Six) Hours As Needed for Itching., Disp: , Rfl:   •  " gabapentin (NEURONTIN) 300 MG capsule, Take 300 mg by mouth 2 (two) times a day., Disp: , Rfl:   •  glimepiride (AMARYL) 1 MG tablet, TAKE 1 TABLET BY MOUTH EVERY MORNING BEFORE BREAKFAST, Disp: 90 tablet, Rfl: 0  •  glucose blood (Accu-Chek Erika) test strip, Use as instructed, Disp: 50 each, Rfl: 12  •  glucose blood test strip, DX E11.9 check bs daily, Disp: 100 each, Rfl: 3  •  HYDROcodone-acetaminophen (NORCO)  MG per tablet, , Disp: , Rfl:   •  Lancets Misc. (ACCU-CHEK FASTCLIX LANCET) kit, 1 kit 4 (Four) Times a Day., Disp: 1 kit, Rfl: 1  •  mometasone (ELOCON) 0.1 % cream, APPLY  CREAM TOPICALLY TO AFFECTED AREA ONCE DAILY AS DIRECTED, Disp: 15 g, Rfl: 0  •  mupirocin (BACTROBAN) 2 % ointment, , Disp: , Rfl:   •  NARCAN 4 MG/0.1ML nasal spray, , Disp: , Rfl:   •  nexIUM 40 MG capsule, TAKE 1 CAPSULE BY MOUTH DAILY, Disp: 90 capsule, Rfl: 3  •  rivaroxaban (Xarelto) 20 MG tablet, Take 1 tablet by mouth Daily., Disp: 90 tablet, Rfl: 3  •  tamsulosin (FLOMAX) 0.4 MG capsule 24 hr capsule, TAKE 1 CAPSULE BY MOUTH EVERY NIGHT, Disp: 90 capsule, Rfl: 1  •  VENTOLIN  (90 Base) MCG/ACT inhaler, INHALE TWO PUFFS BY MOUTH EVERY 4 HOURS AS NEEDED FOR WHEEZING FOR SHORTNESS OF BREATH, Disp: 1 inhaler, Rfl: 1  •  VOLTAREN 1 % gel gel, 3 (three) times a day as needed., Disp: , Rfl:     Objective   VITAL SIGNS:     Vitals:    11/08/21 1058   BP: 150/78   Pulse: 81   Resp: 16   Temp: 97.5 °F (36.4 °C)   TempSrc: Temporal   SpO2: 94%   Weight: 102 kg (223 lb 14.4 oz)   Height: 182.9 cm (72.01\")   PainSc: 0-No pain     Body mass index is 30.36 kg/m².     Wt Readings from Last 5 Encounters:   11/08/21 102 kg (223 lb 14.4 oz)   07/14/21 104 kg (230 lb)   03/08/21 107 kg (236 lb 1.6 oz)   03/01/21 108 kg (237 lb 6.4 oz)   11/16/20 109 kg (241 lb)       PHYSICAL EXAMINATION:   GENERAL: The patient appears in fair general condition, not in acute distress.   SKIN: No ecchymosis.  EYES: No jaundice.  LYMPHATICS: No " cervical lymphadenopathy.  CHEST: Normal respiratory effort.   ABDOMEN: Distended.  Soft. No tenderness. No Hepatomegaly. No Splenomegaly. No masses.  Exam is limited due to the abdominal distention.  EXTREMITIES: No edema.  No calf tenderness.    DIAGNOSTIC DATA:     Results from last 7 days   Lab Units 11/08/21  1041   WBC 10*3/mm3 2.90*   NEUTROS ABS 10*3/mm3 1.78   HEMOGLOBIN g/dL 12.0*   HEMATOCRIT % 37.9   PLATELETS 10*3/mm3 116*     Component      Latest Ref Rng & Units 1/9/2020 9/11/2020 3/8/2021 11/8/2021   Iron      59 - 158 mcg/dL 80 59 66 80   Iron Saturation      20 - 50 % 19 16 17 19 (L)   Transferrin      200 - 360 mg/dL 294 265 276 276   TIBC      298 - 536 mcg/dL 412 371 386 411   Vitamin B-12      211 - 946 pg/mL 1,194 (H) >2,000 (H)  >2,000 (H)   Folate      4.78 - 24.20 ng/mL 15.80   18.30   Ferritin      30.00 - 400.00 ng/mL 140.90 113.10 75.90 87.90        Assessment/Plan   *Leukopenia with neutropenia.   · This was identified on labs in 2019.    · The lowest ANC was 1070 on 3/26/2019.    · Neutrophil count normalized on 1/9/2020 at 2200.    · There was no evidence of vitamin B12 or folate deficiency explain this.    · Labs from 9/11/2020 revealed a low normal WBC.  Neutrophils were 1,990.  · Labs from 3/8/2021 revealed a WBC count of 3370 with a neutrophil count of 1,890.  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  · He was diagnosed with ESTEBAN.  He is not following up with GI although we previously discussed the need to continue to follow-up with GI.    *Normochromic normocytic anemia.    · This was suspected of being anemia of chronic disease.  · SPEP HAI FLC showed no evidence of a monoclonal protein.    · Previous work-up showed no evidence of iron B12 or folate deficiency.  · Patient is currently on vitamin B12 daily.  · Vitamin B12 is now >2000.    *Thrombocytopenia.  · This is also attributed to ESTEBAN.  · Platelet count is today at 116,000.  · Patient is on  chronic anticoagulation with Xarelto.  · He is cleared to continue Xarelto with his current platelet count.    PLAN:    1.  Patient does not need iron supplement at this point.   2.  Reduce vitamin B12 to once weekly.   3.  Follow-up in 9 months with CBC ferritin iron panel B12 and folate levels.       Jewell Kendrick MD  11/08/21

## 2021-11-09 ENCOUNTER — TELEPHONE (OUTPATIENT)
Dept: ONCOLOGY | Facility: CLINIC | Age: 66
End: 2021-11-09

## 2021-11-09 NOTE — TELEPHONE ENCOUNTER
----- Message from Jewell Kendrick MD sent at 11/8/2021  5:42 PM EST -----  Please inform the patient that his vitamin B12 level has become elevated.  Please ask him to reduce vitamin B12 to once weekly.  He does not need iron therapy at this point.    Thank you

## 2021-12-28 RX ORDER — TAMSULOSIN HYDROCHLORIDE 0.4 MG/1
1 CAPSULE ORAL NIGHTLY
Qty: 90 CAPSULE | Refills: 1 | Status: SHIPPED | OUTPATIENT
Start: 2021-12-28 | End: 2022-07-19

## 2022-01-03 RX ORDER — GLIMEPIRIDE 1 MG/1
1 TABLET ORAL
Qty: 90 TABLET | Refills: 0 | Status: SHIPPED | OUTPATIENT
Start: 2022-01-03 | End: 2022-04-02

## 2022-01-18 RX ORDER — BUSPIRONE HYDROCHLORIDE 5 MG/1
5 TABLET ORAL 3 TIMES DAILY
Qty: 90 TABLET | Refills: 0 | Status: SHIPPED | OUTPATIENT
Start: 2022-01-18 | End: 2022-02-22 | Stop reason: SDUPTHER

## 2022-02-22 RX ORDER — BUSPIRONE HYDROCHLORIDE 5 MG/1
5 TABLET ORAL 3 TIMES DAILY
Qty: 90 TABLET | Refills: 2 | Status: SHIPPED | OUTPATIENT
Start: 2022-02-22 | End: 2022-08-02

## 2022-04-02 RX ORDER — GLIMEPIRIDE 1 MG/1
1 TABLET ORAL
Qty: 90 TABLET | Refills: 0 | Status: SHIPPED | OUTPATIENT
Start: 2022-04-02 | End: 2022-06-01

## 2022-06-01 RX ORDER — GLIMEPIRIDE 1 MG/1
1 TABLET ORAL
Qty: 90 TABLET | Refills: 0 | Status: SHIPPED | OUTPATIENT
Start: 2022-06-01 | End: 2022-08-16

## 2022-07-19 RX ORDER — TAMSULOSIN HYDROCHLORIDE 0.4 MG/1
1 CAPSULE ORAL NIGHTLY
Qty: 90 CAPSULE | Refills: 1 | Status: SHIPPED | OUTPATIENT
Start: 2022-07-19

## 2022-08-02 RX ORDER — BUSPIRONE HYDROCHLORIDE 5 MG/1
TABLET ORAL
Qty: 90 TABLET | Refills: 2 | Status: SHIPPED | OUTPATIENT
Start: 2022-08-02 | End: 2022-10-24

## 2022-08-16 RX ORDER — GLIMEPIRIDE 1 MG/1
1 TABLET ORAL
Qty: 90 TABLET | Refills: 0 | Status: SHIPPED | OUTPATIENT
Start: 2022-08-16 | End: 2022-11-14

## 2022-08-29 ENCOUNTER — LAB (OUTPATIENT)
Dept: LAB | Facility: HOSPITAL | Age: 67
End: 2022-08-29

## 2022-08-29 ENCOUNTER — OFFICE VISIT (OUTPATIENT)
Dept: ONCOLOGY | Facility: CLINIC | Age: 67
End: 2022-08-29

## 2022-08-29 VITALS
OXYGEN SATURATION: 95 % | WEIGHT: 222.6 LBS | DIASTOLIC BLOOD PRESSURE: 79 MMHG | BODY MASS INDEX: 30.15 KG/M2 | HEIGHT: 72 IN | SYSTOLIC BLOOD PRESSURE: 122 MMHG | RESPIRATION RATE: 16 BRPM | HEART RATE: 68 BPM | TEMPERATURE: 97.5 F

## 2022-08-29 DIAGNOSIS — D69.6 THROMBOCYTOPENIA: ICD-10-CM

## 2022-08-29 DIAGNOSIS — D64.9 NORMOCHROMIC NORMOCYTIC ANEMIA: ICD-10-CM

## 2022-08-29 DIAGNOSIS — K75.81 NASH (NONALCOHOLIC STEATOHEPATITIS): ICD-10-CM

## 2022-08-29 DIAGNOSIS — D70.9 NEUTROPENIA, UNSPECIFIED TYPE: ICD-10-CM

## 2022-08-29 DIAGNOSIS — D70.9 NEUTROPENIA, UNSPECIFIED TYPE: Primary | ICD-10-CM

## 2022-08-29 LAB
BASOPHILS # BLD AUTO: 0.03 10*3/MM3 (ref 0–0.2)
BASOPHILS NFR BLD AUTO: 1 % (ref 0–1.5)
DEPRECATED RDW RBC AUTO: 45.2 FL (ref 37–54)
EOSINOPHIL # BLD AUTO: 0.14 10*3/MM3 (ref 0–0.4)
EOSINOPHIL NFR BLD AUTO: 4.7 % (ref 0.3–6.2)
ERYTHROCYTE [DISTWIDTH] IN BLOOD BY AUTOMATED COUNT: 12.6 % (ref 12.3–15.4)
FERRITIN SERPL-MCNC: 77.5 NG/ML (ref 30–400)
FOLATE SERPL-MCNC: 12.4 NG/ML (ref 4.78–24.2)
HCT VFR BLD AUTO: 39 % (ref 37.5–51)
HGB BLD-MCNC: 11.9 G/DL (ref 13–17.7)
IMM GRANULOCYTES # BLD AUTO: 0.01 10*3/MM3 (ref 0–0.05)
IMM GRANULOCYTES NFR BLD AUTO: 0.3 % (ref 0–0.5)
IRON 24H UR-MRATE: 90 MCG/DL (ref 59–158)
IRON SATN MFR SERPL: 23 % (ref 14–48)
LYMPHOCYTES # BLD AUTO: 0.86 10*3/MM3 (ref 0.7–3.1)
LYMPHOCYTES NFR BLD AUTO: 29.2 % (ref 19.6–45.3)
MCH RBC QN AUTO: 29.8 PG (ref 26.6–33)
MCHC RBC AUTO-ENTMCNC: 30.5 G/DL (ref 31.5–35.7)
MCV RBC AUTO: 97.7 FL (ref 79–97)
MONOCYTES # BLD AUTO: 0.33 10*3/MM3 (ref 0.1–0.9)
MONOCYTES NFR BLD AUTO: 11.2 % (ref 5–12)
NEUTROPHILS NFR BLD AUTO: 1.58 10*3/MM3 (ref 1.7–7)
NEUTROPHILS NFR BLD AUTO: 53.6 % (ref 42.7–76)
NRBC BLD AUTO-RTO: 0 /100 WBC (ref 0–0.2)
PLATELET # BLD AUTO: 107 10*3/MM3 (ref 140–450)
PMV BLD AUTO: 8.5 FL (ref 6–12)
RBC # BLD AUTO: 3.99 10*6/MM3 (ref 4.14–5.8)
TIBC SERPL-MCNC: 395 MCG/DL (ref 249–505)
TRANSFERRIN SERPL-MCNC: 282 MG/DL (ref 200–360)
VIT B12 BLD-MCNC: 442 PG/ML (ref 211–946)
WBC NRBC COR # BLD: 2.95 10*3/MM3 (ref 3.4–10.8)

## 2022-08-29 PROCEDURE — 82746 ASSAY OF FOLIC ACID SERUM: CPT | Performed by: INTERNAL MEDICINE

## 2022-08-29 PROCEDURE — 99214 OFFICE O/P EST MOD 30 MIN: CPT | Performed by: INTERNAL MEDICINE

## 2022-08-29 PROCEDURE — 84466 ASSAY OF TRANSFERRIN: CPT

## 2022-08-29 PROCEDURE — 82607 VITAMIN B-12: CPT | Performed by: INTERNAL MEDICINE

## 2022-08-29 PROCEDURE — 85025 COMPLETE CBC W/AUTO DIFF WBC: CPT

## 2022-08-29 PROCEDURE — 83540 ASSAY OF IRON: CPT

## 2022-08-29 PROCEDURE — 82728 ASSAY OF FERRITIN: CPT

## 2022-08-29 PROCEDURE — 36415 COLL VENOUS BLD VENIPUNCTURE: CPT

## 2022-08-29 RX ORDER — LANOLIN ALCOHOL/MO/W.PET/CERES
1000 CREAM (GRAM) TOPICAL DAILY
Start: 2022-08-29

## 2022-08-29 NOTE — PROGRESS NOTES
Subjective     CHIEF COMPLAINT:      Chief Complaint   Patient presents with   • Follow-up     No concerns       HISTORY OF PRESENT ILLNESS:     Juventino Rueda is a 67 y.o. male patient who returns today for follow up on his cytopenias.  He returns today for follow-up reporting no new symptoms.  He denies having abdominal pain.  No bleeding or bruising.  He did not have any recent infections.  He is not having problem with bleeding or bruising.    ROS:  Pertinent ROS is in the HPI.     Past medical, surgical, social and family history were reviewed.     MEDICATIONS:    Current Outpatient Medications:   •  alendronate (FOSAMAX) 70 MG tablet, Take 1 tablet by mouth weekly. Take 30 min before 1st meal, do not lay down 30 min after taking med, take with full glass of water., Disp: 4 tablet, Rfl: 11  •  baclofen (LIORESAL) 10 MG tablet, Take 10 mg by mouth 3 (three) times a day., Disp: , Rfl:   •  Blood Glucose Monitoring Suppl (ACCU-CHEK ERIKA PLUS) w/Device kit, 1 kit 4 (Four) Times a Day., Disp: 1 kit, Rfl: 1  •  busPIRone (BUSPAR) 5 MG tablet, TAKE 1 TABLET BY MOUTH THREE TIMES DAILY, Disp: 90 tablet, Rfl: 2  •  calcium carbonate (OS-JOE) 600 MG tablet, Take 600 mg by mouth daily., Disp: , Rfl:   •  Cholecalciferol (VITAMIN D3) 3000 units tablet, Take 5,000 Units by mouth. Takes 1000 iu daily , Disp: , Rfl:   •  Continuous Blood Gluc Sensor (FREESTYLE ELIJAH SENSOR SYSTEM) misc, 1 each 3 (Three) Times a Day., Disp: 3 each, Rfl: 12  •  Diclofenac Sodium (VOLTAREN) 1 % gel gel, APPLY EXTERNALLY TO AFFECTED AREA THREE TIMES DAILY, Disp: , Rfl:   •  diphenhydrAMINE (BENADRYL) 25 mg capsule, Take 25 mg by mouth Every 6 (Six) Hours As Needed for Itching., Disp: , Rfl:   •  gabapentin (NEURONTIN) 300 MG capsule, Take 300 mg by mouth 2 (two) times a day., Disp: , Rfl:   •  glimepiride (AMARYL) 1 MG tablet, TAKE 1 TABLET BY MOUTH EVERY MORNING BEFORE BREAKFAST, Disp: 90 tablet, Rfl: 0  •  glucose blood (Accu-Chek Erika) test  "strip, Dx E11.9 check bs daily/accuchek alex plus strips, Disp: 100 each, Rfl: 12  •  glucose blood test strip, DX E11.9 check bs daily, Disp: 100 each, Rfl: 3  •  HYDROcodone-acetaminophen (NORCO)  MG per tablet, , Disp: , Rfl:   •  Lancets Misc. (ACCU-CHEK FASTCLIX LANCET) kit, 1 kit 4 (Four) Times a Day., Disp: 1 kit, Rfl: 1  •  mupirocin (BACTROBAN) 2 % ointment, , Disp: , Rfl:   •  NARCAN 4 MG/0.1ML nasal spray, , Disp: , Rfl:   •  nexIUM 40 MG capsule, Take 1 capsule by mouth Daily., Disp: 90 capsule, Rfl: 0  •  rivaroxaban (Xarelto) 20 MG tablet, Take 1 tablet by mouth Daily., Disp: 90 tablet, Rfl: 3  •  tamsulosin (FLOMAX) 0.4 MG capsule 24 hr capsule, TAKE 1 CAPSULE BY MOUTH EVERY NIGHT, Disp: 90 capsule, Rfl: 1  •  VENTOLIN  (90 Base) MCG/ACT inhaler, INHALE TWO PUFFS BY MOUTH EVERY 4 HOURS AS NEEDED FOR WHEEZING FOR SHORTNESS OF BREATH, Disp: 1 inhaler, Rfl: 1  •  mometasone (ELOCON) 0.1 % cream, APPLY  CREAM TOPICALLY TO AFFECTED AREA ONCE DAILY AS DIRECTED, Disp: 15 g, Rfl: 0  •  vitamin B-12 (CYANOCOBALAMIN) 1000 MCG tablet, Take 1 tablet by mouth 1 (One) Time Per Week., Disp: , Rfl:   Objective     VITAL SIGNS:     Vitals:    08/29/22 1032   BP: 122/79   Pulse: 68   Resp: 16   Temp: 97.5 °F (36.4 °C)   TempSrc: Temporal   SpO2: 95%   Weight: 101 kg (222 lb 9.6 oz)   Height: 182.9 cm (72.01\")   PainSc:   5   PainLoc: Knee  Comment: both     Body mass index is 30.18 kg/m².     Wt Readings from Last 5 Encounters:   08/29/22 101 kg (222 lb 9.6 oz)   11/08/21 102 kg (223 lb 14.4 oz)   07/14/21 104 kg (230 lb)   03/08/21 107 kg (236 lb 1.6 oz)   03/01/21 108 kg (237 lb 6.4 oz)     PHYSICAL EXAMINATION:   GENERAL: The patient appears in good general condition, not in acute distress.   SKIN: No ecchymosis.  EYES: No jaundice. Pallor.  LYMPHATICS: No cervical lymphadenopathy.  CHEST: Normal respiratory effort.  Lungs clear bilaterally.  No added sounds.  CVS: Normal S1-S2.  No murmurs..  ABDOMEN: " Distended. No tenderness. No Hepatomegaly. No Splenomegaly. No masses.  EXTREMITIES: No noted deformity.     DIAGNOSTIC DATA:     Results from last 7 days   Lab Units 08/29/22  1005   WBC 10*3/mm3 2.95*   NEUTROS ABS 10*3/mm3 1.58*   HEMOGLOBIN g/dL 11.9*   HEMATOCRIT % 39.0   PLATELETS 10*3/mm3 107*         Lab 08/29/22  1005   IRON 90   IRON SATURATION 23   TIBC 395   TRANSFERRIN 282   FERRITIN 77.50   FOLATE 12.40   VITAMIN B 12 442      Assessment & Plan    *Leukopenia with neutropenia.   · This was identified on labs in 2019.    · The lowest ANC was 1070 on 3/26/2019.    · Neutrophil count normalized on 1/9/2020 at 2200.    · There was no evidence of vitamin B12 or folate deficiency explain this.    · Labs from 9/11/2020 revealed a low normal WBC.  Neutrophils were 1,990.  · Labs from 3/8/2021 revealed a WBC count of 3370 with a neutrophil count of 1,890.  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  · He was diagnosed with ESTEBAN.    · WBC is 2150 today.  Neutrophil count is 1580.  · Patient is not having problem with infections.    *Normochromic normocytic anemia.    · This was suspected of being anemia of chronic disease.  · SPEP HAI FLC showed no evidence of a monoclonal protein.    · Previous work-up showed no evidence of iron B12 or folate deficiency.  · He was previously on vitamin B12 daily.    · Vitamin B12 increased to >2000 on 11/8/2021.  · Vitamin B12 was reduced to once weekly on 11/8/2021.  · Hemoglobin is 11.9 today.  · Vitamin B12 level decreased to 442 today.  Folate is normal at 12.4.    *Thrombocytopenia.  · This is also attributed to ESTEBAN.  · Patient is on chronic anticoagulation with Xarelto.  · Platelet count is 107,000 today.  · He is not having problem with bleeding.  · Since his platelet count is staying >50,000-70,000, he is given the okay to continue Xarelto.    PLAN:    1.  Obtain CT scan of the abdomen pelvis to evaluate the liver morphology and the spleen  size.  2.  Increase vitamin B-12 to once daily.   3.  Okay to continue Xarelto 20 mg daily.   4.  We will contact him with the result of the CT scan.  I recommended follow-up with GI.  5.  Follow-up in 9 months with CBC ferritin iron panel B12 and folate levels.      Jewell Kendrick MD  08/29/22

## 2022-08-30 ENCOUNTER — TELEPHONE (OUTPATIENT)
Dept: ONCOLOGY | Facility: CLINIC | Age: 67
End: 2022-08-30

## 2022-08-30 NOTE — TELEPHONE ENCOUNTER
----- Message from Jewell Kendrick MD sent at 8/29/2022  6:15 PM EDT -----  Please inform the patient that vitamin B12 level decreased.  I recommend increasing vitamin B12 back to once daily.    Thank you

## 2022-09-02 ENCOUNTER — HOSPITAL ENCOUNTER (OUTPATIENT)
Dept: CT IMAGING | Facility: HOSPITAL | Age: 67
Discharge: HOME | End: 2022-09-02
Admitting: INTERNAL MEDICINE

## 2022-09-02 DIAGNOSIS — K75.81 NASH (NONALCOHOLIC STEATOHEPATITIS): ICD-10-CM

## 2022-09-02 DIAGNOSIS — D64.9 NORMOCHROMIC NORMOCYTIC ANEMIA: ICD-10-CM

## 2022-09-02 DIAGNOSIS — D69.6 THROMBOCYTOPENIA: ICD-10-CM

## 2022-09-02 DIAGNOSIS — D70.9 NEUTROPENIA, UNSPECIFIED TYPE: ICD-10-CM

## 2022-09-02 LAB — CREAT BLDA-MCNC: 0.8 MG/DL (ref 0.6–1.3)

## 2022-09-02 PROCEDURE — 71260 CT THORAX DX C+: CPT

## 2022-09-02 PROCEDURE — 74177 CT ABD & PELVIS W/CONTRAST: CPT

## 2022-09-02 PROCEDURE — 0 DIATRIZOATE MEGLUMINE & SODIUM PER 1 ML: Performed by: INTERNAL MEDICINE

## 2022-09-02 PROCEDURE — 82565 ASSAY OF CREATININE: CPT

## 2022-09-02 PROCEDURE — 25010000002 IOPAMIDOL 61 % SOLUTION: Performed by: INTERNAL MEDICINE

## 2022-09-02 RX ADMIN — IOPAMIDOL 85 ML: 612 INJECTION, SOLUTION INTRAVENOUS at 16:28

## 2022-09-02 RX ADMIN — DIATRIZOATE MEGLUMINE AND DIATRIZOATE SODIUM 30 ML: 600; 100 SOLUTION ORAL; RECTAL at 16:28

## 2022-09-13 ENCOUNTER — TELEPHONE (OUTPATIENT)
Dept: ONCOLOGY | Facility: CLINIC | Age: 67
End: 2022-09-13

## 2022-09-13 NOTE — TELEPHONE ENCOUNTER
----- Message from Jewell Kendrick MD sent at 9/13/2022  9:22 AM EDT -----  Please inform the patient that the CT scan showed no enlargement of the lymph nodes.  Liver and spleen looked normal.  Stones were seen in both kidneys but they are not obstructing the flow of urine.  No suspicious findings.    Thank you

## 2022-10-24 RX ORDER — BUSPIRONE HYDROCHLORIDE 5 MG/1
TABLET ORAL
Qty: 90 TABLET | Refills: 2 | Status: SHIPPED | OUTPATIENT
Start: 2022-10-24 | End: 2023-01-21

## 2022-11-14 RX ORDER — GLIMEPIRIDE 1 MG/1
1 TABLET ORAL
Qty: 90 TABLET | Refills: 0 | Status: SHIPPED | OUTPATIENT
Start: 2022-11-14 | End: 2023-01-30

## 2023-01-12 ENCOUNTER — OFFICE VISIT (OUTPATIENT)
Dept: FAMILY MEDICINE CLINIC | Facility: CLINIC | Age: 68
End: 2023-01-12
Payer: MEDICARE

## 2023-01-12 VITALS
DIASTOLIC BLOOD PRESSURE: 80 MMHG | HEART RATE: 69 BPM | WEIGHT: 222.4 LBS | SYSTOLIC BLOOD PRESSURE: 172 MMHG | HEIGHT: 72 IN | OXYGEN SATURATION: 93 % | TEMPERATURE: 97.8 F | BODY MASS INDEX: 30.12 KG/M2

## 2023-01-12 DIAGNOSIS — R03.0 ELEVATED BLOOD PRESSURE READING WITHOUT DIAGNOSIS OF HYPERTENSION: ICD-10-CM

## 2023-01-12 DIAGNOSIS — M62.838 MUSCLE SPASM: ICD-10-CM

## 2023-01-12 DIAGNOSIS — R51.9 NONINTRACTABLE HEADACHE, UNSPECIFIED CHRONICITY PATTERN, UNSPECIFIED HEADACHE TYPE: Primary | ICD-10-CM

## 2023-01-12 PROCEDURE — 99214 OFFICE O/P EST MOD 30 MIN: CPT | Performed by: FAMILY MEDICINE

## 2023-01-12 RX ORDER — AMLODIPINE BESYLATE 5 MG/1
5 TABLET ORAL DAILY
Qty: 90 TABLET | Refills: 3 | Status: SHIPPED | OUTPATIENT
Start: 2023-01-12

## 2023-01-12 NOTE — PROGRESS NOTES
"SUBJECTIVE:  The patient is a 67-year-old male.  He is here primarily because of headaches that have been intermittent for the last 2 months.  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  He states if he takes an Excedrin migraine pill that his headaches go away.  The last headache he had was 5 days ago.  No focal signs.  No visual disturbance.  No known injury.  The headaches are in the frontal region on both the left and right side.  He also complains of discomfort in his left pectoral region.  This is relieved by lifting his left arm upward.  No shortness of breath.    PAST MEDICAL HISTORY:  Reviewed.    REVIEW OF SYSTEMS:  Please see above.  All others reviewed and are negative.      OBJECTIVE:   BP (!) 192/88 (BP Location: Left arm, Patient Position: Sitting, Cuff Size: Adult)   Pulse 69   Temp 97.8 °F (36.6 °C)   Ht 182.9 cm (72.01\")   Wt 101 kg (222 lb 6.4 oz)   SpO2 93%   BMI 30.16 kg/m²    Vitals signs are reviewed and are stable.    General:  Well-nourished.  Alert and oriented x3 in no acute distress.  HEENT: PERRLA.   Neck:  Supple.   Lungs:  Clear.    Heart:  Regular rate and rhythm.   Abdomen:   Soft, nontender.   Extremities:  No cyanosis, clubbing or edema.   Neurological:  Grossly intact without motor or sensory deficits.     ASSESSMENT:      Diagnoses and all orders for this visit:    1. Nonintractable headache, unspecified chronicity pattern, unspecified headache type (Primary)  -     CBC & Differential  -     Comprehensive Metabolic Panel    2. Muscle spasm    3. Elevated blood pressure reading without diagnosis of hypertension         PLAN: The patient is not treated for hypertension however his blood pressure initially is elevated at 192/88 today.  He is advised to go to emergency room if symptoms become worse or problems.  I suggested physical therapy for his muscle spasm in his left pectoral region but he states he does not have time for this currently.  I told " him to continue the baclofen and if the relief is obtained by lifting the arm up then go ahead and do this when it happens.  I rechecked his blood pressure.  Repeat blood pressure 170/82.  I discussed this with the patient and going to go ahead and start an antihypertensive (Norvasc 5 mg) and have him monitor his blood pressure daily.  He will let me know what is running in a week or 2.  He will call if any problems..    Dictated utilizing Dragon dictation.

## 2023-01-13 LAB
ALBUMIN SERPL-MCNC: 4.1 G/DL (ref 3.8–4.8)
ALBUMIN/GLOB SERPL: 1.5 {RATIO} (ref 1.2–2.2)
ALP SERPL-CCNC: 80 IU/L (ref 44–121)
ALT SERPL-CCNC: 8 IU/L (ref 0–44)
AST SERPL-CCNC: 18 IU/L (ref 0–40)
BASOPHILS # BLD AUTO: 0 X10E3/UL (ref 0–0.2)
BASOPHILS NFR BLD AUTO: 1 %
BILIRUB SERPL-MCNC: 0.6 MG/DL (ref 0–1.2)
BUN SERPL-MCNC: 13 MG/DL (ref 8–27)
BUN/CREAT SERPL: 18 (ref 10–24)
CALCIUM SERPL-MCNC: 9.2 MG/DL (ref 8.6–10.2)
CHLORIDE SERPL-SCNC: 99 MMOL/L (ref 96–106)
CO2 SERPL-SCNC: 33 MMOL/L (ref 20–29)
CREAT SERPL-MCNC: 0.73 MG/DL (ref 0.76–1.27)
EGFRCR SERPLBLD CKD-EPI 2021: 100 ML/MIN/1.73
EOSINOPHIL # BLD AUTO: 0.1 X10E3/UL (ref 0–0.4)
EOSINOPHIL NFR BLD AUTO: 5 %
ERYTHROCYTE [DISTWIDTH] IN BLOOD BY AUTOMATED COUNT: 12.4 % (ref 11.6–15.4)
GLOBULIN SER CALC-MCNC: 2.8 G/DL (ref 1.5–4.5)
GLUCOSE SERPL-MCNC: 104 MG/DL (ref 70–99)
HCT VFR BLD AUTO: 35.5 % (ref 37.5–51)
HGB BLD-MCNC: 11.3 G/DL (ref 13–17.7)
IMM GRANULOCYTES # BLD AUTO: 0 X10E3/UL (ref 0–0.1)
IMM GRANULOCYTES NFR BLD AUTO: 0 %
LYMPHOCYTES # BLD AUTO: 0.8 X10E3/UL (ref 0.7–3.1)
LYMPHOCYTES NFR BLD AUTO: 26 %
MCH RBC QN AUTO: 29.5 PG (ref 26.6–33)
MCHC RBC AUTO-ENTMCNC: 31.8 G/DL (ref 31.5–35.7)
MCV RBC AUTO: 93 FL (ref 79–97)
MONOCYTES # BLD AUTO: 0.3 X10E3/UL (ref 0.1–0.9)
MONOCYTES NFR BLD AUTO: 8 %
NEUTROPHILS # BLD AUTO: 1.8 X10E3/UL (ref 1.4–7)
NEUTROPHILS NFR BLD AUTO: 60 %
PLATELET # BLD AUTO: 136 X10E3/UL (ref 150–450)
POTASSIUM SERPL-SCNC: 4.9 MMOL/L (ref 3.5–5.2)
PROT SERPL-MCNC: 6.9 G/DL (ref 6–8.5)
RBC # BLD AUTO: 3.83 X10E6/UL (ref 4.14–5.8)
SODIUM SERPL-SCNC: 143 MMOL/L (ref 134–144)
WBC # BLD AUTO: 3 X10E3/UL (ref 3.4–10.8)

## 2023-01-16 ENCOUNTER — TELEPHONE (OUTPATIENT)
Dept: FAMILY MEDICINE CLINIC | Facility: CLINIC | Age: 68
End: 2023-01-16

## 2023-01-16 NOTE — TELEPHONE ENCOUNTER
Provider: MANOLO    Caller: PATIENT    PHONE: [phone]    PATIENT REQUEST A RETURN CALL FROM DR. FRENCH'S NURSE REGARDING HIS HEMOGLOBIN.

## 2023-01-16 NOTE — TELEPHONE ENCOUNTER
Patient states his next apt with hematology is in May will that be ok? Looks like his HGB for last year is between 11-12

## 2023-01-21 RX ORDER — BUSPIRONE HYDROCHLORIDE 5 MG/1
TABLET ORAL
Qty: 90 TABLET | Refills: 2 | Status: SHIPPED | OUTPATIENT
Start: 2023-01-21

## 2023-01-30 RX ORDER — RIVAROXABAN 20 MG/1
TABLET, FILM COATED ORAL
Qty: 90 TABLET | Refills: 3 | Status: SHIPPED | OUTPATIENT
Start: 2023-01-30

## 2023-01-30 RX ORDER — GLIMEPIRIDE 1 MG/1
1 TABLET ORAL
Qty: 90 TABLET | Refills: 0 | Status: SHIPPED | OUTPATIENT
Start: 2023-01-30

## 2023-02-10 ENCOUNTER — TELEPHONE (OUTPATIENT)
Dept: FAMILY MEDICINE CLINIC | Facility: CLINIC | Age: 68
End: 2023-02-10
Payer: MEDICARE

## 2023-02-10 NOTE — TELEPHONE ENCOUNTER
Patient informed Dr Salamanca has reviewed b/p readings continue same Rx monitor b/p 2-3 times per week call for elevated blood pressure

## 2023-02-23 ENCOUNTER — HOSPITAL ENCOUNTER (OUTPATIENT)
Dept: MRI IMAGING | Facility: HOSPITAL | Age: 68
Discharge: HOME | End: 2023-02-23
Admitting: FAMILY MEDICINE
Payer: MEDICARE

## 2023-02-23 DIAGNOSIS — R03.0 ELEVATED BLOOD PRESSURE READING WITHOUT DIAGNOSIS OF HYPERTENSION: ICD-10-CM

## 2023-02-23 DIAGNOSIS — R51.9 NONINTRACTABLE HEADACHE, UNSPECIFIED CHRONICITY PATTERN, UNSPECIFIED HEADACHE TYPE: ICD-10-CM

## 2023-02-23 PROCEDURE — 70551 MRI BRAIN STEM W/O DYE: CPT

## 2023-02-28 NOTE — TELEPHONE ENCOUNTER
So at this point time I do want repeat blood pressure done if it is elevated then we will begin nifedipine ER 30 with patient coming in some time when he can for formal evaluation   Hypoglycemia possible narcotic OD

## 2023-04-21 RX ORDER — BUSPIRONE HYDROCHLORIDE 5 MG/1
TABLET ORAL
Qty: 90 TABLET | Refills: 2 | Status: SHIPPED | OUTPATIENT
Start: 2023-04-21

## 2023-05-01 RX ORDER — GLIMEPIRIDE 1 MG/1
1 TABLET ORAL
Qty: 90 TABLET | Refills: 0 | Status: SHIPPED | OUTPATIENT
Start: 2023-05-01

## 2023-05-22 ENCOUNTER — TELEPHONE (OUTPATIENT)
Dept: ONCOLOGY | Facility: CLINIC | Age: 68
End: 2023-05-22
Payer: MEDICARE

## 2023-05-22 NOTE — TELEPHONE ENCOUNTER
Caller: Juventino Rueda    Relationship to patient: Self    Best call back number: [phone]    Chief complaint: R/S    Type of visit: LAB AND FOLLOW UP    Requested date: EARLY IN THE MORNING     If rescheduling, when is the original appointment: 5-26     Additional notes:PLEASE ADVISE

## 2023-05-22 NOTE — TELEPHONE ENCOUNTER
Caller: Juventino Rueda    Relationship to patient: Self    Best call back number: [phone]    Chief complaint: PT WOULD LIKE TO SEE IF HE CAN GET AND APPT EARLIER IN THE MORNING.     Type of visit: LAB AND FOLLOW UP.     Requested date: SAME DAY JUST EARLIER IN THE MORNING IF POSSIBLE.    If rescheduling, when is the original appointment: 05/25/23    Additional notes:PT WOULD LIKE EARLIER IN THE MORNING IF POSSIBLE. IF NOT PT WILL KEEP APPT THAT HE ALREADY HAS.    PLEASE CALL BACK AND ADVISE PT ON APPT.

## 2023-06-08 ENCOUNTER — OFFICE VISIT (OUTPATIENT)
Dept: ONCOLOGY | Facility: CLINIC | Age: 68
End: 2023-06-08
Payer: MEDICARE

## 2023-06-08 ENCOUNTER — LAB (OUTPATIENT)
Dept: LAB | Facility: HOSPITAL | Age: 68
End: 2023-06-08
Payer: MEDICARE

## 2023-06-08 VITALS
OXYGEN SATURATION: 90 % | HEIGHT: 72 IN | DIASTOLIC BLOOD PRESSURE: 61 MMHG | RESPIRATION RATE: 16 BRPM | WEIGHT: 225.1 LBS | BODY MASS INDEX: 30.49 KG/M2 | TEMPERATURE: 98.4 F | SYSTOLIC BLOOD PRESSURE: 138 MMHG | HEART RATE: 63 BPM

## 2023-06-08 DIAGNOSIS — D70.9 NEUTROPENIA, UNSPECIFIED TYPE: ICD-10-CM

## 2023-06-08 DIAGNOSIS — D69.6 THROMBOCYTOPENIA: ICD-10-CM

## 2023-06-08 DIAGNOSIS — D64.9 NORMOCHROMIC NORMOCYTIC ANEMIA: ICD-10-CM

## 2023-06-08 DIAGNOSIS — D70.9 NEUTROPENIA, UNSPECIFIED TYPE: Primary | ICD-10-CM

## 2023-06-08 DIAGNOSIS — K75.81 NASH (NONALCOHOLIC STEATOHEPATITIS): ICD-10-CM

## 2023-06-08 LAB
BASOPHILS # BLD AUTO: 0.03 10*3/MM3 (ref 0–0.2)
BASOPHILS NFR BLD AUTO: 1.1 % (ref 0–1.5)
DEPRECATED RDW RBC AUTO: 45.9 FL (ref 37–54)
EOSINOPHIL # BLD AUTO: 0.1 10*3/MM3 (ref 0–0.4)
EOSINOPHIL NFR BLD AUTO: 3.7 % (ref 0.3–6.2)
ERYTHROCYTE [DISTWIDTH] IN BLOOD BY AUTOMATED COUNT: 12.9 % (ref 12.3–15.4)
FERRITIN SERPL-MCNC: 52.6 NG/ML (ref 30–400)
FOLATE SERPL-MCNC: >20 NG/ML (ref 4.78–24.2)
HCT VFR BLD AUTO: 36.8 % (ref 37.5–51)
HGB BLD-MCNC: 11.1 G/DL (ref 13–17.7)
IMM GRANULOCYTES # BLD AUTO: 0 10*3/MM3 (ref 0–0.05)
IMM GRANULOCYTES NFR BLD AUTO: 0 % (ref 0–0.5)
IRON 24H UR-MRATE: 62 MCG/DL (ref 59–158)
IRON SATN MFR SERPL: 14 % (ref 14–48)
LYMPHOCYTES # BLD AUTO: 0.71 10*3/MM3 (ref 0.7–3.1)
LYMPHOCYTES NFR BLD AUTO: 26.5 % (ref 19.6–45.3)
MCH RBC QN AUTO: 29.4 PG (ref 26.6–33)
MCHC RBC AUTO-ENTMCNC: 30.2 G/DL (ref 31.5–35.7)
MCV RBC AUTO: 97.4 FL (ref 79–97)
MONOCYTES # BLD AUTO: 0.35 10*3/MM3 (ref 0.1–0.9)
MONOCYTES NFR BLD AUTO: 13.1 % (ref 5–12)
NEUTROPHILS NFR BLD AUTO: 1.49 10*3/MM3 (ref 1.7–7)
NEUTROPHILS NFR BLD AUTO: 55.6 % (ref 42.7–76)
NRBC BLD AUTO-RTO: 0 /100 WBC (ref 0–0.2)
PLATELET # BLD AUTO: 119 10*3/MM3 (ref 140–450)
PMV BLD AUTO: 8.8 FL (ref 6–12)
RBC # BLD AUTO: 3.78 10*6/MM3 (ref 4.14–5.8)
TIBC SERPL-MCNC: 451 MCG/DL (ref 249–505)
TRANSFERRIN SERPL-MCNC: 322 MG/DL (ref 200–360)
VIT B12 BLD-MCNC: 608 PG/ML (ref 211–946)
WBC NRBC COR # BLD: 2.68 10*3/MM3 (ref 3.4–10.8)

## 2023-06-08 PROCEDURE — 83540 ASSAY OF IRON: CPT

## 2023-06-08 PROCEDURE — 85025 COMPLETE CBC W/AUTO DIFF WBC: CPT

## 2023-06-08 PROCEDURE — 99214 OFFICE O/P EST MOD 30 MIN: CPT | Performed by: INTERNAL MEDICINE

## 2023-06-08 PROCEDURE — 36415 COLL VENOUS BLD VENIPUNCTURE: CPT

## 2023-06-08 PROCEDURE — 82728 ASSAY OF FERRITIN: CPT

## 2023-06-08 PROCEDURE — 82607 VITAMIN B-12: CPT | Performed by: INTERNAL MEDICINE

## 2023-06-08 PROCEDURE — 82746 ASSAY OF FOLIC ACID SERUM: CPT | Performed by: INTERNAL MEDICINE

## 2023-06-08 PROCEDURE — 1126F AMNT PAIN NOTED NONE PRSNT: CPT | Performed by: INTERNAL MEDICINE

## 2023-06-08 PROCEDURE — 84466 ASSAY OF TRANSFERRIN: CPT

## 2023-06-08 PROCEDURE — 1160F RVW MEDS BY RX/DR IN RCRD: CPT | Performed by: INTERNAL MEDICINE

## 2023-06-08 PROCEDURE — 1159F MED LIST DOCD IN RCRD: CPT | Performed by: INTERNAL MEDICINE

## 2023-06-08 NOTE — PROGRESS NOTES
Subjective     CHIEF COMPLAINT:      Chief Complaint   Patient presents with    Follow-up     No concern     HISTORY OF PRESENT ILLNESS:     Juventino Rueda is a 68 y.o. male patient who returns today for follow up on his cytopenias.  He returns today for follow-up reporting no new symptoms.  He has been taking vitamin B12 daily regularly as instructed.  He is not having problems with bleeding or bruising.    Patient has chronically elevated left hemidiaphragm.  He occasionally has difficulty when he bends over.    ROS:  Pertinent ROS is in the HPI.     Past medical, surgical, social and family history were reviewed.     MEDICATIONS:    Current Outpatient Medications:     alendronate (FOSAMAX) 70 MG tablet, Take 1 tablet by mouth weekly. Take 30 min before 1st meal, do not lay down 30 min after taking med, take with full glass of water. (Patient not taking: Reported on 6/8/2023), Disp: 4 tablet, Rfl: 11    amLODIPine (NORVASC) 5 MG tablet, Take 1 tablet by mouth Daily., Disp: 90 tablet, Rfl: 3    baclofen (LIORESAL) 10 MG tablet, Take 10 mg by mouth 3 (three) times a day., Disp: , Rfl:     Blood Glucose Monitoring Suppl (ACCU-CHEK LULA PLUS) w/Device kit, 1 kit 4 (Four) Times a Day., Disp: 1 kit, Rfl: 1    busPIRone (BUSPAR) 5 MG tablet, TAKE 1 TABLET BY MOUTH THREE TIMES DAILY, Disp: 90 tablet, Rfl: 2    calcium carbonate (OS-JOE) 600 MG tablet, Take 600 mg by mouth daily., Disp: , Rfl:     Cholecalciferol (VITAMIN D3) 3000 units tablet, Take 5,000 Units by mouth. Takes 1000 iu daily , Disp: , Rfl:     Continuous Blood Gluc Sensor (FREESTYLE ELIJAH SENSOR SYSTEM) misc, 1 each 3 (Three) Times a Day., Disp: 3 each, Rfl: 12    Diclofenac Sodium (VOLTAREN) 1 % gel gel, APPLY EXTERNALLY TO AFFECTED AREA THREE TIMES DAILY, Disp: , Rfl:     diphenhydrAMINE (BENADRYL) 25 mg capsule, Take 25 mg by mouth Every 6 (Six) Hours As Needed for Itching., Disp: , Rfl:     gabapentin (NEURONTIN) 300 MG capsule, Take 300 mg by mouth 2  "(two) times a day., Disp: , Rfl:     glimepiride (AMARYL) 1 MG tablet, TAKE 1 TABLET BY MOUTH EVERY MORNING BEFORE BREAKFAST, Disp: 90 tablet, Rfl: 0    glucose blood (Accu-Chek Erika) test strip, Dx E11.9 check bs daily/accuchek erika plus strips, Disp: 100 each, Rfl: 12    glucose blood test strip, DX E11.9 check bs daily, Disp: 100 each, Rfl: 3    HYDROcodone-acetaminophen (NORCO)  MG per tablet, , Disp: , Rfl:     Lancets Misc. (ACCU-CHEK FASTCLIX LANCET) kit, 1 kit 4 (Four) Times a Day., Disp: 1 kit, Rfl: 1    mometasone (ELOCON) 0.1 % cream, APPLY  CREAM TOPICALLY TO AFFECTED AREA ONCE DAILY AS DIRECTED, Disp: 15 g, Rfl: 0    mupirocin (BACTROBAN) 2 % ointment, , Disp: , Rfl:     NARCAN 4 MG/0.1ML nasal spray, , Disp: , Rfl:     nexIUM 40 MG capsule, TAKE 1 CAPSULE BY MOUTH DAILY, Disp: 30 capsule, Rfl: 0    tamsulosin (FLOMAX) 0.4 MG capsule 24 hr capsule, TAKE 1 CAPSULE BY MOUTH EVERY NIGHT, Disp: 90 capsule, Rfl: 1    VENTOLIN  (90 Base) MCG/ACT inhaler, INHALE TWO PUFFS BY MOUTH EVERY 4 HOURS AS NEEDED FOR WHEEZING FOR SHORTNESS OF BREATH, Disp: 1 inhaler, Rfl: 1    vitamin B-12 (CYANOCOBALAMIN) 1000 MCG tablet, Take 1 tablet by mouth Daily., Disp: , Rfl:     Xarelto 20 MG tablet, TAKE 1 TABLET BY MOUTH DAILY, Disp: 90 tablet, Rfl: 3  Objective     VITAL SIGNS:     Vitals:    06/08/23 1149   BP: 138/61   Pulse: 63   Resp: 16   Temp: 98.4 °F (36.9 °C)   TempSrc: Temporal   SpO2: 90%   Weight: 102 kg (225 lb 1.6 oz)   Height: 182.9 cm (72.01\")   PainSc: 0-No pain     Body mass index is 30.52 kg/m².     Wt Readings from Last 5 Encounters:   06/08/23 102 kg (225 lb 1.6 oz)   01/12/23 101 kg (222 lb 6.4 oz)   08/29/22 101 kg (222 lb 9.6 oz)   11/08/21 102 kg (223 lb 14.4 oz)   07/14/21 104 kg (230 lb)     PHYSICAL EXAMINATION:   GENERAL: The patient appears in good general condition, not in acute distress.   SKIN: No ecchymosis.  EYES: No jaundice. No pallor.  LYMPHATICS: No cervical " lymphadenopathy.  CHEST: Normal respiratory effort.  Lungs clear bilaterally.  No added sounds.  CVS: Normal S1-S2.  No murmurs.  ABDOMEN: Pregnant.  Soft. No tenderness. No Hepatomegaly. No Splenomegaly. No masses.  EXTREMITIES: No edema.    DIAGNOSTIC DATA:     Results from last 7 days   Lab Units 06/08/23  1140   WBC 10*3/mm3 2.68*   NEUTROS ABS 10*3/mm3 1.49*   HEMOGLOBIN g/dL 11.1*   HEMATOCRIT % 36.8*   PLATELETS 10*3/mm3 119*         Lab 06/08/23  1140   IRON 62   IRON SATURATION (TSAT) 14   TIBC 451   TRANSFERRIN 322   FERRITIN 52.60   FOLATE >20.00   VITAMIN B 12 608      CT abdomen pelvis on 9/2/2022:  1. There is some minimal thickening at the inferior margin of the left  major fissure that is unchanged from 2017 and consistent with chronic  scarring. The lungs are otherwise clear. There is no mediastinal or  hilar or axillary adenopathy. There is no pericardial effusion.  2. The liver and spleen are normal in size. There is no fatty  infiltration of the liver. There are no focal liver lesions. The  pancreas and both adrenal glands are unremarkable.  3. There are multiple nonobstructing stones involving both kidneys.  These measure up to 9 mm on the left and 5 mm on the right. There are  also several tiny cortical cysts involving the right kidney.  4. There is no aortic aneurysm or retroperitoneal lymphadenopathy. The  large and small bowel loops are normal in caliber and show no  inflammatory change. No abnormality is seen in the pelvis.  5. At bone windows, no suspicious bone lesions are seen.    Assessment & Plan    *Leukopenia with neutropenia.   This was identified on labs in 2019.    The lowest ANC was 1070 on 3/26/2019.    Neutrophil count normalized on 1/9/2020 at 2200.    There was no evidence of vitamin B12 or folate deficiency explain this.    Labs from 9/11/2020 revealed a low normal WBC.  Neutrophils were 1,990.  Labs from 3/8/2021 revealed a WBC count of 3370 with a neutrophil count of  1,890.  On review of the medical records, patient has diagnosis of ESTEBAN.  CT on 9/2/2022 revealed no fatty infiltration of the liver.  Spleen was normal.  8/29/2022: WBC 2150.  Neutrophil count 1580.  6/8/2023: WBC 2680.  Neutrophil count 1490.  Since his neutrophil count has worsened with no clear etiology, I recommended bone marrow biopsy.    *Normochromic normocytic anemia.    This was suspected of being anemia of chronic disease.  SPEP HAI FLC showed no evidence of a monoclonal protein.    Previous work-up showed no evidence of iron B12 or folate deficiency.  He was previously on vitamin B12 daily.    Vitamin B12 increased to >2000 on 11/8/2021.  Vitamin B12 was reduced to once weekly on 11/8/2021.  8/29/2022: Hemoglobin 11.9.  Vital B12 decreased to 443.  Folate was 12.4.  B12 was increased back to once daily.  6/8/2023: Hemoglobin 11.1.   Transferrin saturation decreased to 14%.    *Thrombocytopenia.  This is also attributed to ESTEBAN.  Patient is on chronic anticoagulation with Xarelto.  Platelet count was 107,000 on 8/29/2022.  Since his platelet count was staying >50,000-70,000, he was continued on Xarelto.  6/8/2023: Platelet count 119,000.    PLAN:    1.   I recommended having bone marrow biopsy done to further evaluate his cytopenias.  I explained the procedure to the patient and he agreed to proceed.  2.  Continue vitamin B12 1000 mcg daily.  3.  Continue Xarelto 20 mg daily.  I asked him to hold it 2 days before the bone marrow biopsy and to restart it the day after the biopsy.    4  I will see him in follow-up in 3-4 weeks to review the results.  CBC and copper levels will be obtained.  May need to start iron replacement based on his transferrin saturation of 14%.        Jewell Kendrick MD  06/08/23

## 2023-10-02 ENCOUNTER — LAB (OUTPATIENT)
Dept: LAB | Facility: HOSPITAL | Age: 68
End: 2023-10-02
Payer: MEDICARE

## 2023-10-02 ENCOUNTER — TELEPHONE (OUTPATIENT)
Dept: ONCOLOGY | Facility: CLINIC | Age: 68
End: 2023-10-02

## 2023-10-02 ENCOUNTER — OFFICE VISIT (OUTPATIENT)
Dept: ONCOLOGY | Facility: CLINIC | Age: 68
End: 2023-10-02
Payer: MEDICARE

## 2023-10-02 VITALS
RESPIRATION RATE: 18 BRPM | SYSTOLIC BLOOD PRESSURE: 121 MMHG | TEMPERATURE: 98.2 F | WEIGHT: 215.4 LBS | HEART RATE: 64 BPM | BODY MASS INDEX: 29.17 KG/M2 | OXYGEN SATURATION: 96 % | HEIGHT: 72 IN | DIASTOLIC BLOOD PRESSURE: 55 MMHG

## 2023-10-02 DIAGNOSIS — Z79.01 CHRONIC ANTICOAGULATION: ICD-10-CM

## 2023-10-02 DIAGNOSIS — D51.3 OTHER DIETARY VITAMIN B12 DEFICIENCY ANEMIA: ICD-10-CM

## 2023-10-02 DIAGNOSIS — D70.9 NEUTROPENIA, UNSPECIFIED TYPE: ICD-10-CM

## 2023-10-02 DIAGNOSIS — D70.9 NEUTROPENIA, UNSPECIFIED TYPE: Primary | ICD-10-CM

## 2023-10-02 DIAGNOSIS — D50.8 IRON DEFICIENCY ANEMIA SECONDARY TO INADEQUATE DIETARY IRON INTAKE: ICD-10-CM

## 2023-10-02 DIAGNOSIS — D69.6 THROMBOCYTOPENIA: ICD-10-CM

## 2023-10-02 DIAGNOSIS — C67.9 MALIGNANT NEOPLASM OF URINARY BLADDER, UNSPECIFIED SITE: ICD-10-CM

## 2023-10-02 LAB
BASOPHILS # BLD AUTO: 0.02 10*3/MM3 (ref 0–0.2)
BASOPHILS NFR BLD AUTO: 0.8 % (ref 0–1.5)
DEPRECATED RDW RBC AUTO: 46.6 FL (ref 37–54)
EOSINOPHIL # BLD AUTO: 0.17 10*3/MM3 (ref 0–0.4)
EOSINOPHIL NFR BLD AUTO: 6.4 % (ref 0.3–6.2)
ERYTHROCYTE [DISTWIDTH] IN BLOOD BY AUTOMATED COUNT: 13 % (ref 12.3–15.4)
FERRITIN SERPL-MCNC: 67.9 NG/ML (ref 30–400)
FOLATE SERPL-MCNC: 17.8 NG/ML (ref 4.78–24.2)
HCT VFR BLD AUTO: 34.5 % (ref 37.5–51)
HGB BLD-MCNC: 10.3 G/DL (ref 13–17.7)
IMM GRANULOCYTES # BLD AUTO: 0.01 10*3/MM3 (ref 0–0.05)
IMM GRANULOCYTES NFR BLD AUTO: 0.4 % (ref 0–0.5)
IRON 24H UR-MRATE: 75 MCG/DL (ref 59–158)
IRON SATN MFR SERPL: 21 % (ref 20–50)
LYMPHOCYTES # BLD AUTO: 0.81 10*3/MM3 (ref 0.7–3.1)
LYMPHOCYTES NFR BLD AUTO: 30.6 % (ref 19.6–45.3)
MCH RBC QN AUTO: 29.1 PG (ref 26.6–33)
MCHC RBC AUTO-ENTMCNC: 29.9 G/DL (ref 31.5–35.7)
MCV RBC AUTO: 97.5 FL (ref 79–97)
MONOCYTES # BLD AUTO: 0.25 10*3/MM3 (ref 0.1–0.9)
MONOCYTES NFR BLD AUTO: 9.4 % (ref 5–12)
NEUTROPHILS NFR BLD AUTO: 1.39 10*3/MM3 (ref 1.7–7)
NEUTROPHILS NFR BLD AUTO: 52.4 % (ref 42.7–76)
NRBC BLD AUTO-RTO: 0 /100 WBC (ref 0–0.2)
PLATELET # BLD AUTO: 111 10*3/MM3 (ref 140–450)
PMV BLD AUTO: 8.7 FL (ref 6–12)
RBC # BLD AUTO: 3.54 10*6/MM3 (ref 4.14–5.8)
TIBC SERPL-MCNC: 363 MCG/DL (ref 298–536)
TRANSFERRIN SERPL-MCNC: 259 MG/DL (ref 200–360)
VIT B12 BLD-MCNC: 544 PG/ML (ref 211–946)
WBC NRBC COR # BLD: 2.65 10*3/MM3 (ref 3.4–10.8)

## 2023-10-02 PROCEDURE — 83540 ASSAY OF IRON: CPT

## 2023-10-02 PROCEDURE — 84466 ASSAY OF TRANSFERRIN: CPT

## 2023-10-02 PROCEDURE — 85025 COMPLETE CBC W/AUTO DIFF WBC: CPT

## 2023-10-02 PROCEDURE — 82607 VITAMIN B-12: CPT | Performed by: INTERNAL MEDICINE

## 2023-10-02 PROCEDURE — 36415 COLL VENOUS BLD VENIPUNCTURE: CPT

## 2023-10-02 PROCEDURE — 82746 ASSAY OF FOLIC ACID SERUM: CPT | Performed by: INTERNAL MEDICINE

## 2023-10-02 PROCEDURE — 82728 ASSAY OF FERRITIN: CPT

## 2023-10-02 NOTE — PROGRESS NOTES
Subjective     CHIEF COMPLAINT:      Chief Complaint   Patient presents with    Follow-up     Discuss stent placement and kidney stones      HISTORY OF PRESENT ILLNESS:     Juventino Rueda is a 68 y.o. male patient who returns today for follow up on his anemia, neutropenia and thrombocytopenia.  He reports that he was hospitalized at [facility].  He was found to have a kidney stone.  On cystoscopy on 9/12/2023, he was found to have a bladder tumor.  Pathology exam showed that the tumor was noninvasive.  He is going to have a follow-up transurethral resection of the tumor and is waiting to have this scheduled.    Patient interrupted his Xarelto for the procedure but he is back on Xarelto.  He reports blood in the urine.    Patient is taking ferrous sulfate and vitamin B-12 daily.    ROS:  Pertinent ROS is in the HPI.     Past medical, surgical, social and family history were reviewed.     MEDICATIONS:    Current Outpatient Medications:     Ascorbic Acid (VITAMIN C PO), Take  by mouth Every Other Day., Disp: , Rfl:     HYDROcodone-acetaminophen (NORCO)  MG per tablet, Take 1 tablet by mouth As Needed., Disp: , Rfl:     NARCAN 4 MG/0.1ML nasal spray, 1 spray into the nostril(s) as directed by provider As Needed., Disp: , Rfl:     amLODIPine (NORVASC) 5 MG tablet, Take 1 tablet by mouth Daily., Disp: 90 tablet, Rfl: 3    baclofen (LIORESAL) 10 MG tablet, Take 1 tablet by mouth 3 (Three) Times a Day., Disp: , Rfl:     Blood Glucose Monitoring Suppl (ACCU-CHEK LULA PLUS) w/Device kit, 1 kit 4 (Four) Times a Day., Disp: 1 kit, Rfl: 1    busPIRone (BUSPAR) 5 MG tablet, TAKE 1 TABLET BY MOUTH THREE TIMES DAILY, Disp: 90 tablet, Rfl: 2    calcium carbonate (OS-JOE) 600 MG tablet, Take 1 tablet by mouth Daily., Disp: , Rfl:     Cholecalciferol (VITAMIN D3) 3000 units tablet, Take 5,000 Units by mouth. Takes 1000 iu daily , Disp: , Rfl:     Continuous Blood Gluc Sensor (FREESTYLE ELIJAH SENSOR SYSTEM)  "misc, 1 each 3 (Three) Times a Day., Disp: 3 each, Rfl: 12    Diclofenac Sodium (VOLTAREN) 1 % gel gel, APPLY EXTERNALLY TO AFFECTED AREA THREE TIMES DAILY, Disp: , Rfl:     diphenhydrAMINE (BENADRYL) 25 mg capsule, Take 1 capsule by mouth Every 6 (Six) Hours As Needed for Itching., Disp: , Rfl:     ferrous sulfate 325 (65 FE) MG tablet, Take 1 tablet by mouth Daily., Disp: , Rfl:     gabapentin (NEURONTIN) 300 MG capsule, Take 1 capsule by mouth 2 (Two) Times a Day., Disp: , Rfl:     glimepiride (AMARYL) 1 MG tablet, TAKE 1 TABLET BY MOUTH EVERY MORNING BEFORE BREAKFAST, Disp: 90 tablet, Rfl: 0    glucose blood (Accu-Chek Erika) test strip, Dx E11.9 check bs daily/accuchek erika plus strips, Disp: 100 each, Rfl: 12    glucose blood test strip, DX E11.9 check bs daily, Disp: 100 each, Rfl: 3    Lancets Misc. (ACCU-CHEK FASTCLIX LANCET) kit, 1 kit 4 (Four) Times a Day., Disp: 1 kit, Rfl: 1    nexIUM 40 MG capsule, TAKE 1 CAPSULE BY MOUTH DAILY, Disp: 30 capsule, Rfl: 0    tamsulosin (FLOMAX) 0.4 MG capsule 24 hr capsule, TAKE 1 CAPSULE BY MOUTH EVERY NIGHT, Disp: 90 capsule, Rfl: 1    VENTOLIN  (90 Base) MCG/ACT inhaler, INHALE TWO PUFFS BY MOUTH EVERY 4 HOURS AS NEEDED FOR WHEEZING FOR SHORTNESS OF BREATH, Disp: 1 inhaler, Rfl: 1    Xarelto 20 MG tablet, TAKE 1 TABLET BY MOUTH DAILY, Disp: 90 tablet, Rfl: 3  Objective     VITAL SIGNS:     Vitals:    10/02/23 1503   BP: 121/55   Pulse: 64   Resp: 18   Temp: 98.2 °F (36.8 °C)   TempSrc: Temporal   SpO2: 96%   Weight: 97.7 kg (215 lb 6.4 oz)   Height: 182.9 cm (72.01\")   PainSc:   5   PainLoc: Groin  Comment: from stent     Body mass index is 29.21 kg/m².     Wt Readings from Last 5 Encounters:   10/02/23 97.7 kg (215 lb 6.4 oz)   06/29/23 103 kg (227 lb 1.6 oz)   06/21/23 102 kg (224 lb)   06/08/23 102 kg (225 lb 1.6 oz)   01/12/23 101 kg (222 lb 6.4 oz)     PHYSICAL EXAMINATION:   GENERAL: The patient appears in fair general condition, not in acute distress. "   SKIN: No ecchymosis.  EYES: No jaundice. No Pallor.  CHEST: Normal respiratory effort.   CVS: No edema.  ABDOMEN: Distended. No tenderness. No Hepatomegaly. No Splenomegaly. No masses.  EXTREMITIES: No noted deformity.     DIAGNOSTIC DATA:     Results from last 7 days   Lab Units 10/02/23  1459   WBC 10*3/mm3 2.65*   NEUTROS ABS 10*3/mm3 1.39*   HEMOGLOBIN g/dL 10.3*   HEMATOCRIT % 34.5*   PLATELETS 10*3/mm3 111*         Lab 10/02/23  1459   IRON 75   IRON SATURATION (TSAT) 21   TIBC 363   TRANSFERRIN 259   FERRITIN 67.90      Component      Latest Ref Rng 6/29/2023   Copper      69 - 132 ug/dL 131      Pathology on 9/12/2023:  DESIGNATED BLADDER TUMOR, CYSTOSCOPIC BIOPSY:              PROCEDURE: BIOPSY.            TUMOR SITE: NOT SPECIFIED.            HISTOLOGIC TYPE: PAPILLARY UROTHELIAL CARCINOMA, NONINVASIVE.            HISTOLOGIC GRADE: HIGH GRADE.            TUMOR EXTENT: NONINVASIVE PAPILLARY CARCINOMA.            LYMPHOVASCULAR INVASION: NOT IDENTIFIED.            MUSCULARIS PROPRIA (DETRUSOR MUSCLE): ABSENT.     CT abdomen and pelvis on 8/10/2023:  Abdomen:   There is a 6 mm obstructing calculus in the right proximal-mid ureter at the L3   level, resulting in minimal right hydronephrosis.  An additional 4 mm   non-obstructing calculus is seen in the mid right kidney.  There are several   non-obstructing calculi in the mid and lower pole calyces of the left kidney,   largest at 10 mm.  There are also a few tiny right kidney cysts.     No significant abnormalities involve the liver.  The spleen is minimally enlarged.   Pancreas is fatty replaced.  Adrenal glands are normal.  Gallbladder is normally   distended with no calculi or biliary duct dilatation.  Scattered small lymph nodes   are seen in the retroperitoneum.  There is no bulky lymphadenopathy.  Abdominal   aorta and IVC are normal caliber and patent.     The stomach is minimally distended with air and fluid.  This also minimal air   distention in  the small bowel.  Moderate diverticulosis is seen in the sigmoid   colon.  There is no diverticulitis.  There is no abscess or free air.     Pelvis:   No mass or fluid collection is seen.  The bladder is normally distended.  The deep   pelvic veins are patent.  Small bilateral inguinal hernias contain fat.     Skeleton:   Minimal arthritis is seen in the spine, especially at L5-S1.  Alignment is   anatomic.     Lower chest:   Left hemidiaphragm is significantly elevated.  There is minimal bibasilar   atelectasis.     IMPRESSION:   1. Obstructing 6 mm calculus right proximal-mid ureter, with minimal right   hydronephrosis.   2. Few additional non-obstructing calculi in the kidneys, predominantly on the   left.     Assessment & Plan    *Leukopenia with neutropenia.   This was identified on labs in 2019.    The lowest ANC was 1070 on 3/26/2019.    Neutrophil count normalized on 1/9/2020 at 2200.    Labs from 9/11/2020 revealed a low normal WBC.  Neutrophils were 1,990.  Labs from 3/8/2021 revealed a WBC count of 3370 with a neutrophil count of 1,890.  On review of the medical records, patient has diagnosis of ESTEBAN.  CT on 9/2/2022 revealed no fatty infiltration of the liver.  Spleen was normal.  8/29/2022: WBC 2150.  Neutrophil count 1580.  6/8/2023: WBC 2680.  Neutrophil count 1490.  Bone marrow biopsy was obtained on 6/21/2023.  It revealed mildly hypocellular bone marrow with 20% cellularity.  No dysplastic changes were seen.  MDS FISH panel revealed no evidence of monosomy/deletion of chromosomes 5, 7, 13, 17 and 20, trisomy 8 or KMT2A.  ?  Secondary to iron and vitamin B-12 deficiency.  6/29/2023: Neutrophil count 2110.  6/29/2023: Copper level normal at 131.  10/2/2023: WBC 2650.  Neutrophil count 1390.  No recent infections.    *Normochromic normocytic anemia.    This was suspected of being anemia of chronic disease.  SPEP [facility] FLC showed no evidence of a monoclonal protein.    No evidence of iron B12 or folate  deficiency.  He was previously on vitamin B12 daily.    Vitamin B12 increased to >2000 on 11/8/2021.  Vitamin B12 was reduced to once weekly on 11/8/2021.  8/29/2022: Hemoglobin 11.9.  Vital B12 decreased to 443.  Folate was 12.4.  B12 was increased back to once daily.  6/8/2023: Hemoglobin 11.1.   Transferrin saturation decreased to 14%.  6/29/2023: Hemoglobin 11.3.  Bone marrow revealed decreased iron stores.  6/29/2023: Patient was started on ferrous sulfate 325 mg daily.  10/2/2023: Hemoglobin decreased to 10.3.  Ferritin 67.9.  Iron saturation 21%  The decline in the hemoglobin is attributed to bleeding from the bladder.    *Thrombocytopenia.  This is also attributed to ESTEBAN.  Patient is on chronic anticoagulation with Xarelto.  Platelet count was 107,000 on 8/29/2022.  Since his platelet count was staying >50,000-70,000, he was continued on Xarelto.  6/8/2023: Platelet count 119,000.  6/29/2023: Platelet count 123,000  10/2/2023: Platelet count 111,000.    *Bladder tumor.    Patient had blood in the urine on 8/18/2023.  CT on 8/10/2023 revealed no lymphadenopathy.  There was a mid right ureter stone with hydronephrosis.  Patient was found on cystoscopy on 9/12/2023 to have a bladder tumor.  The tumor was found at the right ureteral orifice.  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  He is going to have TURBT-awaiting the procedure to be scheduled.      *Chronic anticoagulation with Xarelto.  Patient interrupted Xarelto to have the procedures done but he is back on Xarelto 20 mg daily.    PLAN:    1.  Continue ferrous sulfate 325 mg daily.  2.  Continue vitamin B12 1000 mcg daily.  3.  Await upcoming bladder surgery by Dr. Perez.    4.  We will check with the patient about the indication for Xarelto.  5.  Follow-up in 6 weeks.  We will obtain CBC CMP ferritin iron panel.     I spent 45 minutes caring for Juventino on this date of service. This time includes time spent by me in the  following activities: preparing for the visit, reviewing tests, obtaining and/or reviewing a separately obtained history, performing a medically appropriate examination and/or evaluation, counseling and educating the patient/family/caregiver, ordering medications, tests, or procedures, documenting information in the medical record, independently interpreting results and communicating that information with the patient/family/caregiver, and care coordination       Jewell eKndrick MD  10/02/23

## 2023-10-02 NOTE — TELEPHONE ENCOUNTER
Caller: Juventino Rueda    Relationship to patient: Self    Best call back number: [phone]    Chief complaint: R/S    Type of visit: LAB AND F/U    Requested date: SAME DAY EARLIER APPT, IF NOT LEAVE ORIGINAL     If rescheduling, when is the original appointment: 11-21    Additional notes:

## 2023-10-03 ENCOUNTER — TELEPHONE (OUTPATIENT)
Dept: ONCOLOGY | Facility: CLINIC | Age: 68
End: 2023-10-03
Payer: MEDICARE

## 2023-10-03 NOTE — TELEPHONE ENCOUNTER
Patient states his PCP, Dr. Cohen orders his Xarelto. It was prescribed to him because of a left lower DVT diagnosed a couple years ago.       ----- Message from Jewell Kendrick MD sent at 10/2/2023  4:10 PM EDT -----  Can you please check with the patient who orders Xarelto for him and the indication for its use.  We did not discuss it during today's visit.    Thank you

## 2023-10-11 ENCOUNTER — TELEPHONE (OUTPATIENT)
Dept: ONCOLOGY | Facility: CLINIC | Age: 68
End: 2023-10-11
Payer: MEDICARE

## 2023-10-11 NOTE — TELEPHONE ENCOUNTER
----- Message from Jewell Kendrick MD sent at 10/10/2023  8:50 AM EDT -----  Can you check with the patient if he continues to have blood in the urine? If so, I would recommend reducing the dose of Xarelto to 10 mg daily.    Thank you    ----- Message -----  From: Barb Oconnor RN  Sent: 10/3/2023   2:17 PM EDT  To: Jewell Kendrick MD    He said his PCP, Dr. Cohen prescribes this because he was diagnosed with a left lower extremity DVT a couple years ago.     ----- Message -----  From: Jewell Kendrick MD  Sent: 10/2/2023   4:11 PM EDT  To: Barb Oconnor RN    Can you please check with the patient who orders Xarelto for him and the indication for its use.  We did not discuss it during today's visit.    Thank you

## 2023-10-12 NOTE — TELEPHONE ENCOUNTER
Patient stated he has not had any blood in his urine recently as he has been off of the Xarelto for about a week d/t his bladder surgery with Dr. Perez. The patient resumed his normal dose of Xarelto today. A stent was placed during his surgery that will be taken out next Friday. I have relayed this information to Dr. Kendrick for review.

## 2023-10-12 NOTE — TELEPHONE ENCOUNTER
RE:  Received: Today  Jewell Kendrick MD Noel, Shelbie, RN  Yes. I recommend reducing the dose to 10 mg a day.    Thank you      Relayed this information to the pt, he v/u.

## 2023-11-21 ENCOUNTER — LAB (OUTPATIENT)
Dept: LAB | Facility: HOSPITAL | Age: 68
End: 2023-11-21
Payer: MEDICARE

## 2023-11-21 ENCOUNTER — OFFICE VISIT (OUTPATIENT)
Dept: ONCOLOGY | Facility: CLINIC | Age: 68
End: 2023-11-21
Payer: MEDICARE

## 2023-11-21 VITALS
HEART RATE: 77 BPM | WEIGHT: 225.5 LBS | BODY MASS INDEX: 30.54 KG/M2 | HEIGHT: 72 IN | DIASTOLIC BLOOD PRESSURE: 65 MMHG | OXYGEN SATURATION: 93 % | SYSTOLIC BLOOD PRESSURE: 118 MMHG | TEMPERATURE: 98 F | RESPIRATION RATE: 16 BRPM

## 2023-11-21 DIAGNOSIS — C67.9 MALIGNANT NEOPLASM OF URINARY BLADDER, UNSPECIFIED SITE: ICD-10-CM

## 2023-11-21 DIAGNOSIS — D50.8 IRON DEFICIENCY ANEMIA SECONDARY TO INADEQUATE DIETARY IRON INTAKE: ICD-10-CM

## 2023-11-21 DIAGNOSIS — Z79.01 CHRONIC ANTICOAGULATION: ICD-10-CM

## 2023-11-21 DIAGNOSIS — D70.9 NEUTROPENIA, UNSPECIFIED TYPE: ICD-10-CM

## 2023-11-21 DIAGNOSIS — D69.6 THROMBOCYTOPENIA: ICD-10-CM

## 2023-11-21 DIAGNOSIS — D51.3 OTHER DIETARY VITAMIN B12 DEFICIENCY ANEMIA: ICD-10-CM

## 2023-11-21 DIAGNOSIS — I82.532 CHRONIC DEEP VEIN THROMBOSIS (DVT) OF LEFT POPLITEAL VEIN: ICD-10-CM

## 2023-11-21 DIAGNOSIS — D70.9 NEUTROPENIA, UNSPECIFIED TYPE: Primary | ICD-10-CM

## 2023-11-21 LAB
ALBUMIN SERPL-MCNC: 4.3 G/DL (ref 3.5–5.2)
ALBUMIN/GLOB SERPL: 1.7 G/DL
ALP SERPL-CCNC: 86 U/L (ref 39–117)
ALT SERPL W P-5'-P-CCNC: 14 U/L (ref 1–41)
ANION GAP SERPL CALCULATED.3IONS-SCNC: 5 MMOL/L (ref 5–15)
AST SERPL-CCNC: 17 U/L (ref 1–40)
BASOPHILS # BLD AUTO: 0.02 10*3/MM3 (ref 0–0.2)
BASOPHILS NFR BLD AUTO: 0.6 % (ref 0–1.5)
BILIRUB SERPL-MCNC: 0.6 MG/DL (ref 0–1.2)
BUN SERPL-MCNC: 16 MG/DL (ref 8–23)
BUN/CREAT SERPL: 20.5 (ref 7–25)
CALCIUM SPEC-SCNC: 9.7 MG/DL (ref 8.6–10.5)
CHLORIDE SERPL-SCNC: 101 MMOL/L (ref 98–107)
CO2 SERPL-SCNC: 38 MMOL/L (ref 22–29)
CREAT SERPL-MCNC: 0.78 MG/DL (ref 0.76–1.27)
DEPRECATED RDW RBC AUTO: 47.3 FL (ref 37–54)
EGFRCR SERPLBLD CKD-EPI 2021: 97.7 ML/MIN/1.73
EOSINOPHIL # BLD AUTO: 0.14 10*3/MM3 (ref 0–0.4)
EOSINOPHIL NFR BLD AUTO: 4.2 % (ref 0.3–6.2)
ERYTHROCYTE [DISTWIDTH] IN BLOOD BY AUTOMATED COUNT: 13.2 % (ref 12.3–15.4)
FERRITIN SERPL-MCNC: 46.2 NG/ML (ref 30–400)
GLOBULIN UR ELPH-MCNC: 2.5 GM/DL
GLUCOSE SERPL-MCNC: 128 MG/DL (ref 65–99)
HCT VFR BLD AUTO: 34.1 % (ref 37.5–51)
HGB BLD-MCNC: 10.7 G/DL (ref 13–17.7)
IMM GRANULOCYTES # BLD AUTO: 0 10*3/MM3 (ref 0–0.05)
IMM GRANULOCYTES NFR BLD AUTO: 0 % (ref 0–0.5)
IRON 24H UR-MRATE: 151 MCG/DL (ref 59–158)
IRON SATN MFR SERPL: 33 % (ref 20–50)
LYMPHOCYTES # BLD AUTO: 1.03 10*3/MM3 (ref 0.7–3.1)
LYMPHOCYTES NFR BLD AUTO: 31.2 % (ref 19.6–45.3)
MCH RBC QN AUTO: 30.7 PG (ref 26.6–33)
MCHC RBC AUTO-ENTMCNC: 31.4 G/DL (ref 31.5–35.7)
MCV RBC AUTO: 98 FL (ref 79–97)
MONOCYTES # BLD AUTO: 0.29 10*3/MM3 (ref 0.1–0.9)
MONOCYTES NFR BLD AUTO: 8.8 % (ref 5–12)
NEUTROPHILS NFR BLD AUTO: 1.82 10*3/MM3 (ref 1.7–7)
NEUTROPHILS NFR BLD AUTO: 55.2 % (ref 42.7–76)
NRBC BLD AUTO-RTO: 0 /100 WBC (ref 0–0.2)
PLATELET # BLD AUTO: 110 10*3/MM3 (ref 140–450)
PMV BLD AUTO: 8.8 FL (ref 6–12)
POTASSIUM SERPL-SCNC: 4.5 MMOL/L (ref 3.5–5.2)
PROT SERPL-MCNC: 6.8 G/DL (ref 6–8.5)
RBC # BLD AUTO: 3.48 10*6/MM3 (ref 4.14–5.8)
SODIUM SERPL-SCNC: 144 MMOL/L (ref 136–145)
TIBC SERPL-MCNC: 463 MCG/DL (ref 298–536)
TRANSFERRIN SERPL-MCNC: 311 MG/DL (ref 200–360)
WBC NRBC COR # BLD AUTO: 3.3 10*3/MM3 (ref 3.4–10.8)

## 2023-11-21 PROCEDURE — 84466 ASSAY OF TRANSFERRIN: CPT | Performed by: INTERNAL MEDICINE

## 2023-11-21 PROCEDURE — 36415 COLL VENOUS BLD VENIPUNCTURE: CPT

## 2023-11-21 PROCEDURE — 82728 ASSAY OF FERRITIN: CPT | Performed by: INTERNAL MEDICINE

## 2023-11-21 PROCEDURE — 85025 COMPLETE CBC W/AUTO DIFF WBC: CPT

## 2023-11-21 PROCEDURE — 80053 COMPREHEN METABOLIC PANEL: CPT | Performed by: INTERNAL MEDICINE

## 2023-11-21 PROCEDURE — 83540 ASSAY OF IRON: CPT | Performed by: INTERNAL MEDICINE

## 2023-11-21 NOTE — PROGRESS NOTES
Subjective     CHIEF COMPLAINT:      Chief Complaint   Patient presents with    Follow-up     Discuss Xarelto dosage      HISTORY OF PRESENT ILLNESS:     Juventino Rueda is a 68 y.o. male patient who returns today for follow up on his cytopenias, left leg DVT and bladder tumor.  He returns today for follow-up reporting that he had his TURBT surgery on 10/11/2023.  He tolerated the procedure well.  He initially had bleeding.  He currently has intermittent passage of small blood clots.  He has not been able to cut down the dose of of Xarelto to 10 mg a day due to him having difficulty breaking the tablets by half.  He is going to have follow-up cystoscopy with his urologist in the future.    Patient is taking ferrous sulfate daily.  He is taking vitamin B12 daily.  No problem with constipation.    Patient has chronic wound in the left ankle area.  Has been present for the past 10 years.  He follows with the wound clinic every 2 weeks.  He has remote history of infection that required IV antibiotics and removal of hardware.  The left ankle was ultimately fused.  As a result, he has no movement at the ankle joint.    ROS:  Pertinent ROS is in the HPI.     Past medical, surgical, social and family history were reviewed.     MEDICATIONS:    Current Outpatient Medications:     amLODIPine (NORVASC) 5 MG tablet, Take 1 tablet by mouth Daily., Disp: 90 tablet, Rfl: 3    Ascorbic Acid (VITAMIN C PO), Take  by mouth Every Other Day., Disp: , Rfl:     baclofen (LIORESAL) 10 MG tablet, Take 1 tablet by mouth 3 (Three) Times a Day., Disp: , Rfl:     Blood Glucose Monitoring Suppl (ACCU-CHEK LULA PLUS) w/Device kit, 1 kit 4 (Four) Times a Day., Disp: 1 kit, Rfl: 1    busPIRone (BUSPAR) 5 MG tablet, TAKE 1 TABLET BY MOUTH THREE TIMES DAILY, Disp: 90 tablet, Rfl: 2    calcium carbonate (OS-JOE) 600 MG tablet, Take 1 tablet by mouth Daily., Disp: , Rfl:     Cholecalciferol (VITAMIN D3) 3000 units tablet, Take 5,000 Units by mouth.  "Takes 1000 iu daily , Disp: , Rfl:     Continuous Blood Gluc Sensor (FREESTYLE ELIJAH SENSOR SYSTEM) misc, 1 each 3 (Three) Times a Day., Disp: 3 each, Rfl: 12    Diclofenac Sodium (VOLTAREN) 1 % gel gel, APPLY EXTERNALLY TO AFFECTED AREA THREE TIMES DAILY, Disp: , Rfl:     diphenhydrAMINE (BENADRYL) 25 mg capsule, Take 1 capsule by mouth Every 6 (Six) Hours As Needed for Itching., Disp: , Rfl:     ferrous sulfate 325 (65 FE) MG tablet, Take 1 tablet by mouth Daily., Disp: , Rfl:     gabapentin (NEURONTIN) 300 MG capsule, Take 1 capsule by mouth 2 (Two) Times a Day., Disp: , Rfl:     glimepiride (AMARYL) 1 MG tablet, TAKE 1 TABLET BY MOUTH EVERY MORNING BEFORE BREAKFAST, Disp: 90 tablet, Rfl: 0    glucose blood (Accu-Chek Erika) test strip, Dx E11.9 check bs daily/accuchek erika plus strips, Disp: 100 each, Rfl: 12    glucose blood test strip, DX E11.9 check bs daily, Disp: 100 each, Rfl: 3    HYDROcodone-acetaminophen (NORCO)  MG per tablet, Take 1 tablet by mouth As Needed., Disp: , Rfl:     Lancets Misc. (ACCU-CHEK FASTCLIX LANCET) kit, 1 kit 4 (Four) Times a Day., Disp: 1 kit, Rfl: 1    NARCAN 4 MG/0.1ML nasal spray, 1 spray into the nostril(s) as directed by provider As Needed., Disp: , Rfl:     nexIUM 40 MG capsule, TAKE 1 CAPSULE BY MOUTH DAILY, Disp: 30 capsule, Rfl: 0    rivaroxaban (Xarelto) 20 MG tablet, Take 0.5 tablets by mouth Daily., Disp: , Rfl:     tamsulosin (FLOMAX) 0.4 MG capsule 24 hr capsule, TAKE 1 CAPSULE BY MOUTH EVERY NIGHT, Disp: 90 capsule, Rfl: 1    VENTOLIN  (90 Base) MCG/ACT inhaler, INHALE TWO PUFFS BY MOUTH EVERY 4 HOURS AS NEEDED FOR WHEEZING FOR SHORTNESS OF BREATH, Disp: 1 inhaler, Rfl: 1  Objective     VITAL SIGNS:     Vitals:    11/21/23 1602   BP: 118/65   Pulse: 77   Resp: 16   Temp: 98 °F (36.7 °C)   TempSrc: Temporal   SpO2: 93%   Weight: 102 kg (225 lb 8 oz)   Height: 182.9 cm (72.01\")   PainSc: 0-No pain     Body mass index is 30.58 kg/m².     Wt Readings from " Last 5 Encounters:   11/21/23 102 kg (225 lb 8 oz)   10/02/23 97.7 kg (215 lb 6.4 oz)   06/29/23 103 kg (227 lb 1.6 oz)   06/21/23 102 kg (224 lb)   06/08/23 102 kg (225 lb 1.6 oz)     PHYSICAL EXAMINATION:   GENERAL: The patient appears in good general condition, not in acute distress.   SKIN: No ecchymosis.  EYES: No jaundice. No Pallor.  CHEST: Normal respiratory effort.   CVS: No edema.  ABDOMEN: Protuberant.  EXTREMITIES: Left leg covered with dressing.  No calf tenderness.    DIAGNOSTIC DATA:     Results from last 7 days   Lab Units 11/21/23  1549   WBC 10*3/mm3 3.30*   NEUTROS ABS 10*3/mm3 1.82   HEMOGLOBIN g/dL 10.7*   HEMATOCRIT % 34.1*   PLATELETS 10*3/mm3 110*     Results from last 7 days   Lab Units 11/21/23  1549   SODIUM mmol/L 144   POTASSIUM mmol/L 4.5   CHLORIDE mmol/L 101   CO2 mmol/L 38.0*   BUN mg/dL 16   CREATININE mg/dL 0.78   CALCIUM mg/dL 9.7   ALBUMIN g/dL 4.3   BILIRUBIN mg/dL 0.6   ALK PHOS U/L 86   ALT (SGPT) U/L 14   AST (SGOT) U/L 17   GLUCOSE mg/dL 128*         Lab 11/21/23  1549   IRON 151   IRON SATURATION (TSAT) 33   TIBC 463   TRANSFERRIN 311   FERRITIN 46.20      Pathology exam on 10/11/2023:  BLADDER TUMOR, TRANSURETHRAL RESECTION:              SEGMENTS OF UROTHELIAL MUCOSA WITH FOCAL EROSION, ASSOCIATED WITH UNDERLYING CHRONIC           INFLAMMATION, INCLUDING EOSINOPHILS AND PLASMA CELLS, AND A VERY FOCAL HISTIOCYTIC           REACTION.              UNDERLYING THICK SMOOTH MUSCLE BUNDLES ARE PRESENT.              RESIDUAL NEOPLASM IS NOT IDENTIFIED.     Pathology exam on 9/12/2023:  A.  URETERAL STONE:              CALCULI (TWO).              GROSS DIAGNOSIS ONLY.     B.  DESIGNATED BLADDER TUMOR, CYSTOSCOPIC BIOPSY:              PROCEDURE: BIOPSY.            TUMOR SITE: NOT SPECIFIED.            HISTOLOGIC TYPE: PAPILLARY UROTHELIAL CARCINOMA, NONINVASIVE.            HISTOLOGIC GRADE: HIGH GRADE.            TUMOR EXTENT: NONINVASIVE PAPILLARY CARCINOMA.             LYMPHOVASCULAR INVASION: NOT IDENTIFIED.            MUSCULARIS PROPRIA (DETRUSOR MUSCLE): ABSENT.     Venous Doppler on 10/8/2018:  There was deep venous valvular incompetence noted in the left common femoral, mid femoral and popliteal.  Chronic left lower extremity deep vein thrombosis noted in the popliteal.  All other left sided veins appeared normal.    Venous Doppler on 8/28/2018:  Sub-acute left lower extremity deep vein thrombosis noted in the popliteal.  There was deep venous valvular incompetence noted in the left popliteal.  All other left sided veins appeared normal.    Assessment & Plan    *Leukopenia with neutropenia.   This was identified on labs in 2019.    The lowest ANC was 1070 on 3/26/2019.    Neutrophil count normalized on 1/9/2020 at 2200.    Labs from 9/11/2020 revealed a low normal WBC.  Neutrophils were 1,990.  Labs from 3/8/2021 revealed a WBC count of 3370 with a neutrophil count of 1,890.  On review of the medical records, patient has diagnosis of ESTEBAN.  CT on 9/2/2022 revealed no fatty infiltration of the liver.  Spleen was normal.  8/29/2022: WBC 2150.  Neutrophil count 1580.  6/8/2023: WBC 2680.  Neutrophil count 1490.  Bone marrow biopsy was obtained on 6/21/2023.  It revealed mildly hypocellular bone marrow with 20% cellularity.  No dysplastic changes were seen.  MDS FISH panel revealed no evidence of monosomy/deletion of chromosomes 5, 7, 13, 17 and 20, trisomy 8 or KMT2A.  ?  Secondary to iron and vitamin B-12 deficiency.  6/29/2023: Neutrophil count 2110.  6/29/2023: Copper level normal at 131.  10/2/2023: WBC 2650.  Neutrophil count 1390.  11/21/2023: WBC improved to 3300.  Neutrophils improved to 1820.    *Normochromic normocytic anemia.    This was suspected of being anemia of chronic disease.  SPEP HAI FLC showed no evidence of a monoclonal protein.    No evidence of iron B12 or folate deficiency.  He was previously on vitamin B12 daily.    Vitamin B12 increased to >2000 on  11/8/2021.  Vitamin B12 was reduced to once weekly on 11/8/2021.  8/29/2022: Hemoglobin 11.9.  Vital B12 decreased to 443.  Folate was 12.4.  B12 was increased back to once daily.  6/8/2023: Hemoglobin 11.1.   Transferrin saturation decreased to 14%.  6/29/2023: Hemoglobin 11.3.  Bone marrow revealed decreased iron stores.  6/29/2023: Patient was started on ferrous sulfate 325 mg daily.  10/2/2023: Hemoglobin decreased to 10.3.  Ferritin 67.9.  Iron saturation 21%  11/21/2023: Hemoglobin 10.7.  He is tolerating the ferrous sulfate.      *Thrombocytopenia.  This is also attributed to ESTEBAN.  Patient is on chronic anticoagulation with Xarelto.  Platelet count was 107,000 on 8/29/2022.  Since his platelet count was staying >50,000-70,000, he was continued on Xarelto.  6/8/2023: Platelet count 119,000.  6/29/2023: Platelet count 123,000  10/2/2023: Platelet count 111,000.  11/21/2023: Platelet count stable at 110,000.    *Bladder tumor.    Patient had blood in the urine on 8/18/2023.  CT on 8/10/2023 revealed no lymphadenopathy.  There was a mid right ureter stone with hydronephrosis.  Patient was found on cystoscopy on 9/12/2023 to have a bladder tumor.  The tumor was found at the right ureteral orifice.  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  S/p TURBT on 10/11/2023.  Exam revealed no residual neoplasm.  Since no tumor was identified on the TURBT specimen, I did not recommend adjuvant therapy.    *Left lower extremity deep vein thrombosis.  Patient was diagnosed with DVT in the left popliteal vein on 8/28/2018.  No preceding trauma, surgery or hospital stay.  However, he has chronic wounds in the left ankle area (since around 2013).  The wound and the problem with the left ankle limited his movement.  He is on chronic anticoagulation with Xarelto 20 mg daily.  Patient interrupted Xarelto to have the cystoscopy and TURBT procedures.  He is back on Xarelto 20 mg daily.  He has intermittent  passage of blood clots.  I recommended evaluation for the degree of residual thrombosis.  He may be able to reduce the dose to 10 mg daily long-term.  Due to his chronic wound in the left leg and decreased mobility, he is at increased risk for recurrent thrombosis.    PLAN:    1.  Obtain venous Doppler of the left lower extremity on 11/27/2023.  Depending on the degree of residual thrombosis, we may be able to reduce the dose to 10 mg daily long-term.   2.  Due to the patient being unable to break the tablets by half, we would consider 20 mg every other day until he is able to obtain new supply of Xarelto 10 mg tablets.   3.  Continue ferrous sulfate 325 mg daily.   4.  Continue vitamin B12 1000 mcg daily.   5.  Follow-up in 4 months.  We will obtain CBC CMP ferritin iron panel vitamin B12 and folate levels.      I spent 45 minutes caring for Juventino on this date of service. This time includes time spent by me in the following activities: preparing for the visit, reviewing tests, obtaining and/or reviewing a separately obtained history, performing a medically appropriate examination and/or evaluation, counseling and educating the patient/family/caregiver, ordering medications, tests, or procedures, documenting information in the medical record, independently interpreting results and communicating that information with the patient/family/caregiver, and care coordination     Jewell Kendrick MD  11/21/23

## 2023-11-27 ENCOUNTER — HOSPITAL ENCOUNTER (OUTPATIENT)
Dept: CARDIOLOGY | Facility: HOSPITAL | Age: 68
Discharge: HOME | End: 2023-11-27
Admitting: INTERNAL MEDICINE
Payer: MEDICARE

## 2023-11-27 DIAGNOSIS — I82.532 CHRONIC DEEP VEIN THROMBOSIS (DVT) OF LEFT POPLITEAL VEIN: ICD-10-CM

## 2023-11-27 LAB
BH CV LOW VAS LEFT MID FEMORAL SPONT: 1
BH CV LOW VAS LEFT POPLITEAL SPONT: 1
BH CV LOWER VASCULAR LEFT COMMON FEMORAL AUGMENT: NORMAL
BH CV LOWER VASCULAR LEFT COMMON FEMORAL COMPETENT: NORMAL
BH CV LOWER VASCULAR LEFT COMMON FEMORAL COMPRESS: NORMAL
BH CV LOWER VASCULAR LEFT COMMON FEMORAL PHASIC: NORMAL
BH CV LOWER VASCULAR LEFT COMMON FEMORAL SPONT: NORMAL
BH CV LOWER VASCULAR LEFT DISTAL FEMORAL COMPRESS: NORMAL
BH CV LOWER VASCULAR LEFT GASTRONEMIUS COMPRESS: NORMAL
BH CV LOWER VASCULAR LEFT GREATER SAPH AK COMPRESS: NORMAL
BH CV LOWER VASCULAR LEFT GREATER SAPH BK COMPRESS: NORMAL
BH CV LOWER VASCULAR LEFT LESSER SAPH COMPRESS: NORMAL
BH CV LOWER VASCULAR LEFT MID FEMORAL AUGMENT: NORMAL
BH CV LOWER VASCULAR LEFT MID FEMORAL COMPETENT: NORMAL
BH CV LOWER VASCULAR LEFT MID FEMORAL COMPRESS: NORMAL
BH CV LOWER VASCULAR LEFT MID FEMORAL PHASIC: NORMAL
BH CV LOWER VASCULAR LEFT MID FEMORAL SPONT: NORMAL
BH CV LOWER VASCULAR LEFT PERONEAL COMPRESS: NORMAL
BH CV LOWER VASCULAR LEFT POPLITEAL AUGMENT: NORMAL
BH CV LOWER VASCULAR LEFT POPLITEAL COMPETENT: NORMAL
BH CV LOWER VASCULAR LEFT POPLITEAL COMPRESS: NORMAL
BH CV LOWER VASCULAR LEFT POPLITEAL PHASIC: NORMAL
BH CV LOWER VASCULAR LEFT POPLITEAL SPONT: NORMAL
BH CV LOWER VASCULAR LEFT POPLITEAL THROMBUS: NORMAL
BH CV LOWER VASCULAR LEFT POSTERIOR TIBIAL COMPRESS: NORMAL
BH CV LOWER VASCULAR LEFT PROFUNDA FEMORAL COMPRESS: NORMAL
BH CV LOWER VASCULAR LEFT PROXIMAL FEMORAL COMPRESS: NORMAL
BH CV LOWER VASCULAR LEFT SAPHENOFEMORAL JUNCTION COMPRESS: NORMAL
BH CV LOWER VASCULAR RIGHT COMMON FEMORAL AUGMENT: NORMAL
BH CV LOWER VASCULAR RIGHT COMMON FEMORAL COMPETENT: NORMAL
BH CV LOWER VASCULAR RIGHT COMMON FEMORAL COMPRESS: NORMAL
BH CV LOWER VASCULAR RIGHT COMMON FEMORAL PHASIC: NORMAL
BH CV LOWER VASCULAR RIGHT COMMON FEMORAL SPONT: NORMAL

## 2023-11-27 PROCEDURE — 93971 EXTREMITY STUDY: CPT

## 2023-11-28 ENCOUNTER — TELEPHONE (OUTPATIENT)
Dept: ONCOLOGY | Facility: CLINIC | Age: 68
End: 2023-11-28
Payer: MEDICARE

## 2023-11-28 NOTE — TELEPHONE ENCOUNTER
Reviewed Dr. Kendrick's note and instructions with pt, he v/u. Pt requested a new prescription be sent to his pharmacy.

## 2023-11-28 NOTE — TELEPHONE ENCOUNTER
----- Message from Jewell Kendrick MD sent at 11/27/2023  9:49 PM EST -----  Please inform the patient that the venous doppler revealed an old blood clot in a vein behind the left knee. Based on this, I recommend reducing Xarelto to 10 mg daily.    Thank you

## 2024-03-19 ENCOUNTER — TELEPHONE (OUTPATIENT)
Dept: ONCOLOGY | Facility: CLINIC | Age: 69
End: 2024-03-19
Payer: MEDICARE

## 2024-03-19 NOTE — TELEPHONE ENCOUNTER
Spoke with pt and rescheduled appt due to pt not feeling well. Pt confirmed new date and time for appt.

## 2024-03-19 NOTE — TELEPHONE ENCOUNTER
Caller: Juventino Rueda    Relationship to patient: Self    Best call back number: [phone]     Chief complaint: R/S    Type of visit: LAB & FOLLOW UP 1    Requested date: CALL PT TO R/S     If rescheduling, when is the original appointment: 3/21

## 2024-04-25 ENCOUNTER — OFFICE VISIT (OUTPATIENT)
Dept: ONCOLOGY | Facility: CLINIC | Age: 69
End: 2024-04-25
Payer: MEDICARE

## 2024-04-25 ENCOUNTER — LAB (OUTPATIENT)
Dept: LAB | Facility: HOSPITAL | Age: 69
End: 2024-04-25
Payer: MEDICARE

## 2024-04-25 VITALS
RESPIRATION RATE: 18 BRPM | BODY MASS INDEX: 31.44 KG/M2 | HEART RATE: 81 BPM | OXYGEN SATURATION: 93 % | DIASTOLIC BLOOD PRESSURE: 69 MMHG | HEIGHT: 72 IN | TEMPERATURE: 96.9 F | SYSTOLIC BLOOD PRESSURE: 134 MMHG | WEIGHT: 232.1 LBS

## 2024-04-25 DIAGNOSIS — I82.532 CHRONIC DEEP VEIN THROMBOSIS (DVT) OF LEFT POPLITEAL VEIN: ICD-10-CM

## 2024-04-25 DIAGNOSIS — D69.6 THROMBOCYTOPENIA: ICD-10-CM

## 2024-04-25 DIAGNOSIS — D70.9 NEUTROPENIA, UNSPECIFIED TYPE: ICD-10-CM

## 2024-04-25 DIAGNOSIS — D50.8 IRON DEFICIENCY ANEMIA SECONDARY TO INADEQUATE DIETARY IRON INTAKE: ICD-10-CM

## 2024-04-25 DIAGNOSIS — R16.1 SPLENOMEGALY: ICD-10-CM

## 2024-04-25 DIAGNOSIS — D70.9 NEUTROPENIA, UNSPECIFIED TYPE: Primary | ICD-10-CM

## 2024-04-25 DIAGNOSIS — C67.9 MALIGNANT NEOPLASM OF URINARY BLADDER, UNSPECIFIED SITE: ICD-10-CM

## 2024-04-25 LAB
BASOPHILS # BLD AUTO: 0.02 10*3/MM3 (ref 0–0.2)
BASOPHILS NFR BLD AUTO: 1 % (ref 0–1.5)
DEPRECATED RDW RBC AUTO: 48.5 FL (ref 37–54)
EOSINOPHIL # BLD AUTO: 0.11 10*3/MM3 (ref 0–0.4)
EOSINOPHIL NFR BLD AUTO: 5.6 % (ref 0.3–6.2)
ERYTHROCYTE [DISTWIDTH] IN BLOOD BY AUTOMATED COUNT: 13 % (ref 12.3–15.4)
FERRITIN SERPL-MCNC: 57.9 NG/ML (ref 30–400)
FOLATE SERPL-MCNC: 19.1 NG/ML (ref 4.78–24.2)
HCT VFR BLD AUTO: 35.4 % (ref 37.5–51)
HGB BLD-MCNC: 10.6 G/DL (ref 13–17.7)
IMM GRANULOCYTES # BLD AUTO: 0.01 10*3/MM3 (ref 0–0.05)
IMM GRANULOCYTES NFR BLD AUTO: 0.5 % (ref 0–0.5)
IRON 24H UR-MRATE: 57 MCG/DL (ref 59–158)
IRON SATN MFR SERPL: 15 % (ref 20–50)
LYMPHOCYTES # BLD AUTO: 0.72 10*3/MM3 (ref 0.7–3.1)
LYMPHOCYTES NFR BLD AUTO: 36.7 % (ref 19.6–45.3)
MCH RBC QN AUTO: 30.5 PG (ref 26.6–33)
MCHC RBC AUTO-ENTMCNC: 29.9 G/DL (ref 31.5–35.7)
MCV RBC AUTO: 101.7 FL (ref 79–97)
MONOCYTES # BLD AUTO: 0.23 10*3/MM3 (ref 0.1–0.9)
MONOCYTES NFR BLD AUTO: 11.7 % (ref 5–12)
NEUTROPHILS NFR BLD AUTO: 0.87 10*3/MM3 (ref 1.7–7)
NEUTROPHILS NFR BLD AUTO: 44.5 % (ref 42.7–76)
NRBC BLD AUTO-RTO: 0 /100 WBC (ref 0–0.2)
PLATELET # BLD AUTO: 111 10*3/MM3 (ref 140–450)
PMV BLD AUTO: 9.2 FL (ref 6–12)
RBC # BLD AUTO: 3.48 10*6/MM3 (ref 4.14–5.8)
TIBC SERPL-MCNC: 373 MCG/DL (ref 298–536)
TRANSFERRIN SERPL-MCNC: 250 MG/DL (ref 200–360)
VIT B12 BLD-MCNC: >2000 PG/ML (ref 211–946)
WBC NRBC COR # BLD AUTO: 1.96 10*3/MM3 (ref 3.4–10.8)

## 2024-04-25 PROCEDURE — 82746 ASSAY OF FOLIC ACID SERUM: CPT | Performed by: INTERNAL MEDICINE

## 2024-04-25 PROCEDURE — 82728 ASSAY OF FERRITIN: CPT

## 2024-04-25 PROCEDURE — 83540 ASSAY OF IRON: CPT

## 2024-04-25 PROCEDURE — 36415 COLL VENOUS BLD VENIPUNCTURE: CPT

## 2024-04-25 PROCEDURE — 82607 VITAMIN B-12: CPT | Performed by: INTERNAL MEDICINE

## 2024-04-25 PROCEDURE — 85025 COMPLETE CBC W/AUTO DIFF WBC: CPT

## 2024-04-25 PROCEDURE — 84466 ASSAY OF TRANSFERRIN: CPT

## 2024-04-25 NOTE — PROGRESS NOTES
Subjective     CHIEF COMPLAINT:      Chief Complaint   Patient presents with    Follow-up     HISTORY OF PRESENT ILLNESS:     Juventino Rueda is a 69 y.o. male patient who returns today for follow up on his pancytopenia.  She returns today for follow-up reporting that he was hospitalized at [facility] in February 2024.  He had a fall and landed on the left upper.  Studies showed no fracture.  He was diagnosed with pneumonia.  He was seen by hematology during the hospital stay.  He was placed on Neupogen due to his neutropenia.    Patient reports that he is taking the oral iron and vitamin B12 once daily..  He is taking Xarelto 10 mg daily.  He recently had kidney stones and was having hematuria.  He is going to have a cystoscopy with his urologist next Friday, 5/20/2024.    Patient reports fatigue.  He has abdominal distention but no abdominal pain.    ROS:  Pertinent ROS is in the HPI.     Past medical, surgical, social and family history were reviewed.     MEDICATIONS:    Current Outpatient Medications:     amLODIPine (NORVASC) 5 MG tablet, Take 1 tablet by mouth Daily., Disp: 90 tablet, Rfl: 3    Ascorbic Acid (VITAMIN C PO), Take  by mouth Every Other Day., Disp: , Rfl:     baclofen (LIORESAL) 10 MG tablet, Take 1 tablet by mouth 3 (Three) Times a Day., Disp: , Rfl:     Blood Glucose Monitoring Suppl (ACCU-CHEK LULA PLUS) w/Device kit, 1 kit 4 (Four) Times a Day., Disp: 1 kit, Rfl: 1    busPIRone (BUSPAR) 5 MG tablet, TAKE 1 TABLET BY MOUTH THREE TIMES DAILY, Disp: 90 tablet, Rfl: 2    calcium carbonate (OS-JOE) 600 MG tablet, Take 1 tablet by mouth Daily., Disp: , Rfl:     Cholecalciferol (VITAMIN D3) 3000 units tablet, Take 5,000 Units by mouth. Takes 1000 iu daily , Disp: , Rfl:     Continuous Blood Gluc Sensor (UV Flu TechnologiesYLE ELIJAH SENSOR SYSTEM) misc, 1 each 3 (Three) Times a Day., Disp: 3 each, Rfl: 12    Diclofenac Sodium (VOLTAREN) 1 % gel gel, APPLY EXTERNALLY TO AFFECTED AREA THREE TIMES  "DAILY, Disp: , Rfl:     diphenhydrAMINE (BENADRYL) 25 mg capsule, Take 1 capsule by mouth Every 6 (Six) Hours As Needed for Itching., Disp: , Rfl:     ferrous sulfate 325 (65 FE) MG tablet, Take 1 tablet by mouth Daily., Disp: , Rfl:     gabapentin (NEURONTIN) 300 MG capsule, Take 1 capsule by mouth 2 (Two) Times a Day., Disp: , Rfl:     glimepiride (AMARYL) 1 MG tablet, TAKE 1 TABLET BY MOUTH EVERY MORNING BEFORE BREAKFAST, Disp: 90 tablet, Rfl: 0    glucose blood (Accu-Chek Erika) test strip, Dx E11.9 check bs daily/accuchek erika plus strips, Disp: 100 each, Rfl: 12    glucose blood test strip, DX E11.9 check bs daily, Disp: 100 each, Rfl: 3    HYDROcodone-acetaminophen (NORCO)  MG per tablet, Take 1 tablet by mouth As Needed., Disp: , Rfl:     Lancets Misc. (ACCU-CHEK FASTCLIX LANCET) kit, 1 kit 4 (Four) Times a Day., Disp: 1 kit, Rfl: 1    NARCAN 4 MG/0.1ML nasal spray, 1 spray into the nostril(s) as directed by provider As Needed., Disp: , Rfl:     nexIUM 40 MG capsule, TAKE 1 CAPSULE BY MOUTH DAILY, Disp: 30 capsule, Rfl: 0    rivaroxaban (Xarelto) 10 MG tablet, Take 1 tablet by mouth Daily., Disp: 90 tablet, Rfl: 1    tamsulosin (FLOMAX) 0.4 MG capsule 24 hr capsule, TAKE 1 CAPSULE BY MOUTH EVERY NIGHT, Disp: 90 capsule, Rfl: 1    VENTOLIN  (90 Base) MCG/ACT inhaler, INHALE TWO PUFFS BY MOUTH EVERY 4 HOURS AS NEEDED FOR WHEEZING FOR SHORTNESS OF BREATH, Disp: 1 inhaler, Rfl: 1  Objective     VITAL SIGNS:     Vitals:    04/25/24 1115   BP: 134/69   Pulse: 81   Resp: 18   Temp: 96.9 °F (36.1 °C)   TempSrc: Temporal   SpO2: 93%   Weight: 105 kg (232 lb 1.6 oz)   Height: 182.9 cm (72.01\")   PainSc: 0-No pain     Body mass index is 31.47 kg/m².     Wt Readings from Last 5 Encounters:   04/25/24 105 kg (232 lb 1.6 oz)   11/21/23 102 kg (225 lb 8 oz)   10/02/23 97.7 kg (215 lb 6.4 oz)   06/29/23 103 kg (227 lb 1.6 oz)   06/21/23 102 kg (224 lb)     PHYSICAL EXAMINATION:   GENERAL: The patient appears " in fair general condition, not in acute distress.   SKIN: No ecchymosis.  EYES: No jaundice. Pallor.  CHEST: Normal respiratory effort.  Lungs clear bilaterally.  No added sounds.  CVS: Normal S1-S2.  No murmurs.  ABDOMEN: Protuberant.  No tenderness. No Hepatomegaly. No Splenomegaly. No masses.  EXTREMITIES: Bilateral leg edema.    DIAGNOSTIC DATA:     Results from last 7 days   Lab Units 04/25/24  1106   WBC 10*3/mm3 1.96*   NEUTROS ABS 10*3/mm3 0.87*   HEMOGLOBIN g/dL 10.6*   HEMATOCRIT % 35.4*   PLATELETS 10*3/mm3 111*         Lab 04/25/24  1106   IRON 57*   IRON SATURATION (TSAT) 15*   TIBC 373   TRANSFERRIN 250   FERRITIN 57.90      Venous Doppler on 11/27/2023:    Chronic left lower extremity deep vein thrombosis noted in the popliteal.    There was deep venous valvular incompetence noted in the left mid femoral and popliteal.    All other left sided veins appeared normal.    Assessment & Plan    *Leukopenia with neutropenia.   This was identified on labs in 2019.    ANC was 1070 on 3/26/2019.    1/9/2020: Neutrophil count normalized at 2200.    Labs from 9/11/2020 revealed a low normal WBC.  Neutrophils were 1,990.  Labs from 3/8/2021 revealed a WBC count of 3370 with a neutrophil count of 1,890.  On review of the medical records, patient has diagnosis of ESTEBAN.  CT on 9/2/2022 revealed no fatty infiltration of the liver.  Spleen was normal.  8/29/2022: WBC 2150.  Neutrophil count 1580.  6/8/2023: WBC 2680.  Neutrophil count 1490.  Bone marrow biopsy was obtained on 6/21/2023.  It revealed mildly hypocellular bone marrow with 20% cellularity.  No dysplastic changes were seen.  MDS FISH panel revealed no evidence of monosomy/deletion of chromosomes 5, 7, 13, 17 and 20, trisomy 8 or KMT2A.  ?  Secondary to iron and vitamin B-12 deficiency.  6/29/2023: Neutrophil count 2110.  6/29/2023: Copper level normal at 131.  10/2/2023: WBC 2650.  Neutrophil count 1390.  11/21/2023: WBC improved to 3300.  Neutrophils  improved to 1820.  WBC decreased to 1300 on 2/23/2024 while at [facility] for pneumonia.  He was given Neupogen during the hospital stay.  4/25/2024: WBC 1960.  Neutrophils 870.    *Normochromic normocytic anemia.    This was suspected of being anemia of chronic disease.  SPEP HAI FLC showed no evidence of a monoclonal protein.    No evidence of iron B12 or folate deficiency.  He was previously on vitamin B12 daily.    Vitamin B12 increased to >2000 on 11/8/2021.  Vitamin B12 was reduced to once weekly on 11/8/2021.  8/29/2022: Hemoglobin 11.9.  Vital B12 decreased to 443.  Folate was 12.4.  B12 was increased back to once daily.  6/8/2023: Hemoglobin 11.1.   Transferrin saturation decreased to 14%.  6/29/2023: Hemoglobin 11.3.  Bone marrow revealed decreased iron stores.  6/29/2023: Patient was started on ferrous sulfate 325 mg daily.  10/2/2023: Hemoglobin decreased to 10.3.  Ferritin 67.9.  Iron saturation 21%  11/21/2023: Hemoglobin 10.7.  He was continued on ferrous sulfate 325 mg daily and vitamin B12 1000 mcg daily.  4/25/2024: Hemoglobin 10.6.  Ferritin 57.9.  Transferrin saturation 15%.  Based on the persistence of the iron deficiency anemia despite taking the oral iron which is indicative of decreased absorption, I recommended IV iron.  Patient decided to hold off on the IV iron for now.    *Thrombocytopenia.  This was also attributed to ESTEBAN.  Patient is on chronic anticoagulation with Xarelto.  Platelet count was 107,000 on 8/29/2022.  Since his platelet count was staying >50,000-70,000, he was continued on Xarelto.  6/8/2023: Platelet count 119,000.  11/21/2023: Platelet count stable at 110,000.  CT abdomen and pelvis in on 2/22/2024 at [facility] revealed mild splenomegaly.  The splenomegaly is likely contributing to the leukopenia and neutropenia.  4/25/2024: Platelet count 111,000.    *Bladder tumor.    Patient had blood in the urine on 8/18/2023.  CT on 8/10/2023 revealed no lymphadenopathy.  There was a mid  right ureter stone with hydronephrosis.  Patient was found on cystoscopy on 9/12/2023 to have a bladder tumor.  The tumor was found at the right ureteral orifice.  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  S/p TURBT on 10/11/2023.  Exam revealed no residual neoplasm.  Since no tumor was identified on the TURBT specimen, adjuvant therapy was not recommended.  He is to have follow-up cystoscopy on 5/3/2024.    *Left lower extremity deep vein thrombosis.  Patient was diagnosed with DVT in the left popliteal vein on 8/28/2018.  No preceding trauma, surgery or hospital stay.  However, he has chronic wounds in the left ankle area (since around 2013).  The wound and the problem with the left ankle limited his movement.  He is on chronic anticoagulation with Xarelto 20 mg daily.  Patient interrupted Xarelto to have the cystoscopy and TURBT procedures.  Venous Doppler on 11/27/2023 revealed chronic DVT in the left popliteal vein.  Xarelto was reduced to 10 mg daily on 11/28/2023.  He is having less bleeding since the dose of Xarelto was reduced.    PLAN:    1.   Continue Xarelto 10 mg daily.  I asked him to stop Xarelto 2 days before the upcoming cystoscopy and start back 2 days after.  2.  Continue ferrous sulfate 325 mg once daily.  3.  Continue vitamin B12 1000 mcg once daily.  4.  Follow-up in 4 months.  We will obtain CBC ferritin iron panel vitamin B12 folate and methylmalonic acid levels.  5.  If the patient's anemia does not improve with continued oral iron, I would recommend IV iron with IV Injectafer 750 mg x 2 doses, 1 week apart.    I spent 45 minutes caring for Juventino on this date of service. This time includes time spent by me in the following activities: preparing for the visit, reviewing tests, obtaining and/or reviewing a separately obtained history, performing a medically appropriate examination and/or evaluation, counseling and educating the patient/family/caregiver, ordering  medications, tests, or procedures, documenting information in the medical record, and independently interpreting results and communicating that information with the patient/family/caregiver       Jewell Kendrick MD  04/25/24

## 2024-05-24 RX ORDER — RIVAROXABAN 10 MG/1
10 TABLET, FILM COATED ORAL DAILY
Qty: 90 TABLET | Refills: 1 | Status: SHIPPED | OUTPATIENT
Start: 2024-05-24

## 2024-10-25 ENCOUNTER — OFFICE VISIT (OUTPATIENT)
Dept: ONCOLOGY | Facility: CLINIC | Age: 69
End: 2024-10-25
Payer: MEDICARE

## 2024-10-25 ENCOUNTER — LAB (OUTPATIENT)
Dept: LAB | Facility: HOSPITAL | Age: 69
End: 2024-10-25
Payer: MEDICARE

## 2024-10-25 VITALS
HEART RATE: 55 BPM | TEMPERATURE: 97.6 F | DIASTOLIC BLOOD PRESSURE: 67 MMHG | OXYGEN SATURATION: 94 % | HEIGHT: 74 IN | WEIGHT: 224.6 LBS | SYSTOLIC BLOOD PRESSURE: 134 MMHG | BODY MASS INDEX: 28.83 KG/M2

## 2024-10-25 DIAGNOSIS — D50.8 IRON DEFICIENCY ANEMIA SECONDARY TO INADEQUATE DIETARY IRON INTAKE: ICD-10-CM

## 2024-10-25 DIAGNOSIS — D70.9 NEUTROPENIA, UNSPECIFIED TYPE: ICD-10-CM

## 2024-10-25 DIAGNOSIS — I82.532 CHRONIC DEEP VEIN THROMBOSIS (DVT) OF LEFT POPLITEAL VEIN: ICD-10-CM

## 2024-10-25 DIAGNOSIS — D69.6 THROMBOCYTOPENIA: ICD-10-CM

## 2024-10-25 DIAGNOSIS — R16.1 SPLENOMEGALY: ICD-10-CM

## 2024-10-25 DIAGNOSIS — C67.9 MALIGNANT NEOPLASM OF URINARY BLADDER, UNSPECIFIED SITE: ICD-10-CM

## 2024-10-25 DIAGNOSIS — I82.532 CHRONIC DEEP VEIN THROMBOSIS (DVT) OF LEFT POPLITEAL VEIN: Primary | ICD-10-CM

## 2024-10-25 LAB
BASOPHILS # BLD AUTO: 0.02 10*3/MM3 (ref 0–0.2)
BASOPHILS NFR BLD AUTO: 0.8 % (ref 0–1.5)
DEPRECATED RDW RBC AUTO: 44.4 FL (ref 37–54)
EOSINOPHIL # BLD AUTO: 0.19 10*3/MM3 (ref 0–0.4)
EOSINOPHIL NFR BLD AUTO: 7.2 % (ref 0.3–6.2)
ERYTHROCYTE [DISTWIDTH] IN BLOOD BY AUTOMATED COUNT: 12.5 % (ref 12.3–15.4)
FERRITIN SERPL-MCNC: 103 NG/ML (ref 30–400)
FOLATE SERPL-MCNC: >20 NG/ML (ref 4.78–24.2)
HCT VFR BLD AUTO: 36.8 % (ref 37.5–51)
HGB BLD-MCNC: 11.7 G/DL (ref 13–17.7)
IMM GRANULOCYTES # BLD AUTO: 0.01 10*3/MM3 (ref 0–0.05)
IMM GRANULOCYTES NFR BLD AUTO: 0.4 % (ref 0–0.5)
IRON 24H UR-MRATE: 65 MCG/DL (ref 59–158)
IRON SATN MFR SERPL: 18 % (ref 20–50)
LYMPHOCYTES # BLD AUTO: 0.81 10*3/MM3 (ref 0.7–3.1)
LYMPHOCYTES NFR BLD AUTO: 30.7 % (ref 19.6–45.3)
MCH RBC QN AUTO: 31 PG (ref 26.6–33)
MCHC RBC AUTO-ENTMCNC: 31.8 G/DL (ref 31.5–35.7)
MCV RBC AUTO: 97.4 FL (ref 79–97)
MONOCYTES # BLD AUTO: 0.29 10*3/MM3 (ref 0.1–0.9)
MONOCYTES NFR BLD AUTO: 11 % (ref 5–12)
NEUTROPHILS NFR BLD AUTO: 1.32 10*3/MM3 (ref 1.7–7)
NEUTROPHILS NFR BLD AUTO: 49.9 % (ref 42.7–76)
NRBC BLD AUTO-RTO: 0 /100 WBC (ref 0–0.2)
PLATELET # BLD AUTO: 107 10*3/MM3 (ref 140–450)
PMV BLD AUTO: 8.4 FL (ref 6–12)
RBC # BLD AUTO: 3.78 10*6/MM3 (ref 4.14–5.8)
TIBC SERPL-MCNC: 361 MCG/DL (ref 298–536)
TRANSFERRIN SERPL-MCNC: 242 MG/DL (ref 200–360)
VIT B12 BLD-MCNC: 1017 PG/ML (ref 211–946)
WBC NRBC COR # BLD AUTO: 2.64 10*3/MM3 (ref 3.4–10.8)

## 2024-10-25 PROCEDURE — 84466 ASSAY OF TRANSFERRIN: CPT

## 2024-10-25 PROCEDURE — 82728 ASSAY OF FERRITIN: CPT

## 2024-10-25 PROCEDURE — 85025 COMPLETE CBC W/AUTO DIFF WBC: CPT

## 2024-10-25 PROCEDURE — 82746 ASSAY OF FOLIC ACID SERUM: CPT | Performed by: INTERNAL MEDICINE

## 2024-10-25 PROCEDURE — 83540 ASSAY OF IRON: CPT

## 2024-10-25 PROCEDURE — 36415 COLL VENOUS BLD VENIPUNCTURE: CPT

## 2024-10-25 PROCEDURE — 82607 VITAMIN B-12: CPT | Performed by: INTERNAL MEDICINE

## 2024-10-25 NOTE — PROGRESS NOTES
Subjective     CHIEF COMPLAINT:      Chief Complaint   Patient presents with    Follow-up     Follow up and blood work       HISTORY OF PRESENT ILLNESS:     Juventino Rueda is a 69 y.o. male patient who returns today for follow up on his pancytopenia.  She returns today for follow-up reporting that he was hospitalized at [facility] in February 2024.  He had a fall and landed on the left upper.  Studies showed no fracture.  He was diagnosed with pneumonia.  He was seen by hematology during the hospital stay.  He was placed on Neupogen due to his neutropenia.    He continues his oral iron once daily, vitamin B12 1000 mcg daily, and Xarelto 10 mg daily.  He denies any recent procedures or bleeding.    He has stable fatigue.    ROS:  Pertinent ROS is in the HPI.     Past medical, surgical, social and family history were reviewed.     MEDICATIONS:    Current Outpatient Medications:     amLODIPine (NORVASC) 5 MG tablet, Take 1 tablet by mouth Daily., Disp: 90 tablet, Rfl: 3    Ascorbic Acid (VITAMIN C PO), Take  by mouth Every Other Day., Disp: , Rfl:     baclofen (LIORESAL) 10 MG tablet, Take 1 tablet by mouth 3 (Three) Times a Day., Disp: , Rfl:     Blood Glucose Monitoring Suppl (ACCU-CHEK LULA PLUS) w/Device kit, 1 kit 4 (Four) Times a Day., Disp: 1 kit, Rfl: 1    busPIRone (BUSPAR) 5 MG tablet, TAKE 1 TABLET BY MOUTH THREE TIMES DAILY, Disp: 90 tablet, Rfl: 2    calcium carbonate (OS-JOE) 600 MG tablet, Take 1 tablet by mouth Daily., Disp: , Rfl:     Cholecalciferol (VITAMIN D3) 3000 units tablet, Take 5,000 Units by mouth. Takes 1000 iu daily , Disp: , Rfl:     Continuous Blood Gluc Sensor (FREESTYLE ELIJAH SENSOR SYSTEM) misc, 1 each 3 (Three) Times a Day., Disp: 3 each, Rfl: 12    Diclofenac Sodium (VOLTAREN) 1 % gel gel, APPLY EXTERNALLY TO AFFECTED AREA THREE TIMES DAILY, Disp: , Rfl:     diphenhydrAMINE (BENADRYL) 25 mg capsule, Take 1 capsule by mouth Every 6 (Six) Hours As Needed for Itching.,  "Disp: , Rfl:     ferrous sulfate 325 (65 FE) MG tablet, Take 1 tablet by mouth Daily., Disp: , Rfl:     gabapentin (NEURONTIN) 300 MG capsule, Take 1 capsule by mouth 2 (Two) Times a Day., Disp: , Rfl:     glimepiride (AMARYL) 1 MG tablet, TAKE 1 TABLET BY MOUTH EVERY MORNING BEFORE BREAKFAST, Disp: 90 tablet, Rfl: 0    glucose blood (Accu-Chek Erika) test strip, Dx E11.9 check bs daily/accuchek erika plus strips, Disp: 100 each, Rfl: 12    glucose blood test strip, DX E11.9 check bs daily, Disp: 100 each, Rfl: 3    HYDROcodone-acetaminophen (NORCO)  MG per tablet, Take 1 tablet by mouth As Needed., Disp: , Rfl:     Lancets Misc. (ACCU-CHEK FASTCLIX LANCET) kit, 1 kit 4 (Four) Times a Day., Disp: 1 kit, Rfl: 1    NARCAN 4 MG/0.1ML nasal spray, Administer 1 spray into the nostril(s) as directed by provider As Needed., Disp: , Rfl:     nexIUM 40 MG capsule, TAKE 1 CAPSULE BY MOUTH DAILY, Disp: 30 capsule, Rfl: 0    tamsulosin (FLOMAX) 0.4 MG capsule 24 hr capsule, TAKE 1 CAPSULE BY MOUTH EVERY NIGHT, Disp: 90 capsule, Rfl: 1    VENTOLIN  (90 Base) MCG/ACT inhaler, INHALE TWO PUFFS BY MOUTH EVERY 4 HOURS AS NEEDED FOR WHEEZING FOR SHORTNESS OF BREATH, Disp: 1 inhaler, Rfl: 1    Xarelto 10 MG tablet, TAKE 1 TABLET BY MOUTH DAILY, Disp: 90 tablet, Rfl: 1  Objective     VITAL SIGNS:     Vitals:    10/25/24 1129   BP: 134/67   Pulse: 55   Temp: 97.6 °F (36.4 °C)   TempSrc: Oral   SpO2: 94%   Weight: 102 kg (224 lb 9.6 oz)   Height: 188 cm (74\")   PainSc: 0-No pain     Body mass index is 28.84 kg/m².     Wt Readings from Last 5 Encounters:   10/25/24 102 kg (224 lb 9.6 oz)   04/25/24 105 kg (232 lb 1.6 oz)   11/21/23 102 kg (225 lb 8 oz)   10/02/23 97.7 kg (215 lb 6.4 oz)   06/29/23 103 kg (227 lb 1.6 oz)     PHYSICAL EXAMINATION:   GENERAL: The patient appears in fair general condition, not in acute distress.   SKIN: No ecchymosis.  EYES: No jaundice. Pallor.  CHEST: Normal respiratory effort.  Lungs clear " bilaterally.  No added sounds.  CVS: Normal S1-S2.  No murmurs.  ABDOMEN: Protuberant.  No tenderness. No Hepatomegaly. No Splenomegaly. No masses.  EXTREMITIES: Bilateral leg edema.    DIAGNOSTIC DATA:     Results from last 7 days   Lab Units 10/25/24  1123   WBC 10*3/mm3 2.64*   NEUTROS ABS 10*3/mm3 1.32*   HEMOGLOBIN g/dL 11.7*   HEMATOCRIT % 36.8*   PLATELETS 10*3/mm3 107*            Venous Doppler on 11/27/2023:    Chronic left lower extremity deep vein thrombosis noted in the popliteal.    There was deep venous valvular incompetence noted in the left mid femoral and popliteal.    All other left sided veins appeared normal.    Assessment & Plan    *Leukopenia with neutropenia.   This was identified on labs in 2019.    ANC was 1070 on 3/26/2019.    1/9/2020: Neutrophil count normalized at 2200.    Labs from 9/11/2020 revealed a low normal WBC.  Neutrophils were 1,990.  Labs from 3/8/2021 revealed a WBC count of 3370 with a neutrophil count of 1,890.  On review of the medical records, patient has diagnosis of ESTEBAN.  CT on 9/2/2022 revealed no fatty infiltration of the liver.  Spleen was normal.  8/29/2022: WBC 2150.  Neutrophil count 1580.  6/8/2023: WBC 2680.  Neutrophil count 1490.  Bone marrow biopsy was obtained on 6/21/2023.  It revealed mildly hypocellular bone marrow with 20% cellularity.  No dysplastic changes were seen.  MDS FISH panel revealed no evidence of monosomy/deletion of chromosomes 5, 7, 13, 17 and 20, trisomy 8 or KMT2A.  ?  Secondary to iron and vitamin B-12 deficiency.  6/29/2023: Neutrophil count 2110.  6/29/2023: Copper level normal at 131.  10/2/2023: WBC 2650.  Neutrophil count 1390.  11/21/2023: WBC improved to 3300.  Neutrophils improved to 1820.  WBC decreased to 1300 on 2/23/2024 while at [facility] for pneumonia.  He was given Neupogen during the hospital stay.  4/25/2024: WBC 1960.  Neutrophils 870.  10/25/2024: WBC 2640, ANC 1320    *Normochromic normocytic anemia.    This was  suspected of being anemia of chronic disease.  SPEP HAI FLC showed no evidence of a monoclonal protein.    No evidence of iron B12 or folate deficiency.  He was previously on vitamin B12 daily.    Vitamin B12 increased to >2000 on 11/8/2021.  Vitamin B12 was reduced to once weekly on 11/8/2021.  8/29/2022: Hemoglobin 11.9.  Vital B12 decreased to 443.  Folate was 12.4.  B12 was increased back to once daily.  6/8/2023: Hemoglobin 11.1.   Transferrin saturation decreased to 14%.  6/29/2023: Hemoglobin 11.3.  Bone marrow revealed decreased iron stores.  6/29/2023: Patient was started on ferrous sulfate 325 mg daily.  10/2/2023: Hemoglobin decreased to 10.3.  Ferritin 67.9.  Iron saturation 21%  11/21/2023: Hemoglobin 10.7.  He was continued on ferrous sulfate 325 mg daily and vitamin B12 1000 mcg daily.  4/25/2024: Hemoglobin 10.6.  Ferritin 57.9.  Transferrin saturation 15%.  Based on the persistence of the iron deficiency anemia despite taking the oral iron which is indicative of decreased absorption, I recommended IV iron.  Patient decided to hold off on the IV iron for now.    *Thrombocytopenia.  This was also attributed to ESTEBAN.  Patient is on chronic anticoagulation with Xarelto.  Platelet count was 107,000 on 8/29/2022.  Since his platelet count was staying >50,000-70,000, he was continued on Xarelto.  6/8/2023: Platelet count 119,000.  11/21/2023: Platelet count stable at 110,000.  CT abdomen and pelvis in on 2/22/2024 at [facility] revealed mild splenomegaly.  The splenomegaly is likely contributing to the leukopenia and neutropenia.  4/25/2024: Platelet count 111,000.  10/25/2024: Platelet count 107,000    *Bladder tumor.    Patient had blood in the urine on 8/18/2023.  CT on 8/10/2023 revealed no lymphadenopathy.  There was a mid right ureter stone with hydronephrosis.  Patient was found on cystoscopy on 9/12/2023 to have a bladder tumor.  The tumor was found at the right ureteral orifice.  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  S/p TURBT on 10/11/2023.  Exam revealed no residual neoplasm.  Since no tumor was identified on the TURBT specimen, adjuvant therapy was not recommended.  He is to have follow-up cystoscopy on 5/3/2024.    *Left lower extremity deep vein thrombosis.  Patient was diagnosed with DVT in the left popliteal vein on 8/28/2018.  No preceding trauma, surgery or hospital stay.  However, he has chronic wounds in the left ankle area (since around 2013).  The wound and the problem with the left ankle limited his movement.  He is on chronic anticoagulation with Xarelto 20 mg daily.  Patient interrupted Xarelto to have the cystoscopy and TURBT procedures.  Venous Doppler on 11/27/2023 revealed chronic DVT in the left popliteal vein.  Xarelto was reduced to 10 mg daily on 11/28/2023.  He is having less bleeding since the dose of Xarelto was reduced.  10/25/2024 denies any bleeding on Xarelto 10 mg daily    PLAN:    1.   Continue Xarelto 10 mg daily  2.  Continue ferrous sulfate 325 mg once daily.  3.  Continue vitamin B12 1000 mcg once daily.  4.  Follow-up in 4 months.  We will obtain CBC ferritin iron CITLALLI Schulz  10/25/24

## 2024-11-03 LAB — METHYLMALONATE SERPL-SCNC: 132 NMOL/L (ref 0–378)

## 2025-01-18 NOTE — TELEPHONE ENCOUNTER
Radiology called re positive dvt, Dr Salamanca spoke with patient on the phone, samples given to start 15mg bid x 21 days then 20mg daily thereafter repeat doppler in 6 weeks.   Unknown

## 2025-02-27 ENCOUNTER — OFFICE VISIT (OUTPATIENT)
Dept: ONCOLOGY | Facility: CLINIC | Age: 70
End: 2025-02-27
Payer: MEDICARE

## 2025-02-27 ENCOUNTER — LAB (OUTPATIENT)
Dept: LAB | Facility: HOSPITAL | Age: 70
End: 2025-02-27
Payer: MEDICARE

## 2025-02-27 VITALS
OXYGEN SATURATION: 94 % | TEMPERATURE: 97.6 F | HEART RATE: 81 BPM | BODY MASS INDEX: 28.09 KG/M2 | SYSTOLIC BLOOD PRESSURE: 124 MMHG | WEIGHT: 218.9 LBS | HEIGHT: 74 IN | DIASTOLIC BLOOD PRESSURE: 75 MMHG

## 2025-02-27 DIAGNOSIS — D51.3 OTHER DIETARY VITAMIN B12 DEFICIENCY ANEMIA: ICD-10-CM

## 2025-02-27 DIAGNOSIS — D70.9 NEUTROPENIA, UNSPECIFIED TYPE: Primary | ICD-10-CM

## 2025-02-27 DIAGNOSIS — C67.9 MALIGNANT NEOPLASM OF URINARY BLADDER, UNSPECIFIED SITE: ICD-10-CM

## 2025-02-27 DIAGNOSIS — D50.8 IRON DEFICIENCY ANEMIA SECONDARY TO INADEQUATE DIETARY IRON INTAKE: ICD-10-CM

## 2025-02-27 DIAGNOSIS — D69.6 THROMBOCYTOPENIA: ICD-10-CM

## 2025-02-27 DIAGNOSIS — I82.532 CHRONIC DEEP VEIN THROMBOSIS (DVT) OF LEFT POPLITEAL VEIN: ICD-10-CM

## 2025-02-27 DIAGNOSIS — D70.9 NEUTROPENIA, UNSPECIFIED TYPE: ICD-10-CM

## 2025-02-27 LAB
BASOPHILS # BLD AUTO: 0.03 10*3/MM3 (ref 0–0.2)
BASOPHILS NFR BLD AUTO: 1 % (ref 0–1.5)
DEPRECATED RDW RBC AUTO: 46 FL (ref 37–54)
EOSINOPHIL # BLD AUTO: 0.15 10*3/MM3 (ref 0–0.4)
EOSINOPHIL NFR BLD AUTO: 4.8 % (ref 0.3–6.2)
ERYTHROCYTE [DISTWIDTH] IN BLOOD BY AUTOMATED COUNT: 12.9 % (ref 12.3–15.4)
FERRITIN SERPL-MCNC: 121 NG/ML (ref 30–400)
HCT VFR BLD AUTO: 37.4 % (ref 37.5–51)
HGB BLD-MCNC: 11.5 G/DL (ref 13–17.7)
IMM GRANULOCYTES # BLD AUTO: 0.01 10*3/MM3 (ref 0–0.05)
IMM GRANULOCYTES NFR BLD AUTO: 0.3 % (ref 0–0.5)
IRON 24H UR-MRATE: 92 MCG/DL (ref 59–158)
IRON SATN MFR SERPL: 26 % (ref 20–50)
LYMPHOCYTES # BLD AUTO: 0.81 10*3/MM3 (ref 0.7–3.1)
LYMPHOCYTES NFR BLD AUTO: 25.9 % (ref 19.6–45.3)
MCH RBC QN AUTO: 30.3 PG (ref 26.6–33)
MCHC RBC AUTO-ENTMCNC: 30.7 G/DL (ref 31.5–35.7)
MCV RBC AUTO: 98.7 FL (ref 79–97)
MONOCYTES # BLD AUTO: 0.3 10*3/MM3 (ref 0.1–0.9)
MONOCYTES NFR BLD AUTO: 9.6 % (ref 5–12)
NEUTROPHILS NFR BLD AUTO: 1.83 10*3/MM3 (ref 1.7–7)
NEUTROPHILS NFR BLD AUTO: 58.4 % (ref 42.7–76)
NRBC BLD AUTO-RTO: 0 /100 WBC (ref 0–0.2)
PLATELET # BLD AUTO: 113 10*3/MM3 (ref 140–450)
PMV BLD AUTO: 8.9 FL (ref 6–12)
RBC # BLD AUTO: 3.79 10*6/MM3 (ref 4.14–5.8)
TIBC SERPL-MCNC: 358 MCG/DL (ref 298–536)
TRANSFERRIN SERPL-MCNC: 240 MG/DL (ref 200–360)
WBC NRBC COR # BLD AUTO: 3.13 10*3/MM3 (ref 3.4–10.8)

## 2025-02-27 PROCEDURE — 1125F AMNT PAIN NOTED PAIN PRSNT: CPT | Performed by: INTERNAL MEDICINE

## 2025-02-27 PROCEDURE — 85025 COMPLETE CBC W/AUTO DIFF WBC: CPT

## 2025-02-27 PROCEDURE — 83540 ASSAY OF IRON: CPT

## 2025-02-27 PROCEDURE — 36415 COLL VENOUS BLD VENIPUNCTURE: CPT

## 2025-02-27 PROCEDURE — 84466 ASSAY OF TRANSFERRIN: CPT

## 2025-02-27 PROCEDURE — 1160F RVW MEDS BY RX/DR IN RCRD: CPT | Performed by: INTERNAL MEDICINE

## 2025-02-27 PROCEDURE — 99214 OFFICE O/P EST MOD 30 MIN: CPT | Performed by: INTERNAL MEDICINE

## 2025-02-27 PROCEDURE — 82728 ASSAY OF FERRITIN: CPT

## 2025-02-27 PROCEDURE — 1159F MED LIST DOCD IN RCRD: CPT | Performed by: INTERNAL MEDICINE

## 2025-02-27 NOTE — PROGRESS NOTES
Subjective     CHIEF COMPLAINT:      Chief Complaint   Patient presents with    Follow-up     Lab review     HISTORY OF PRESENT ILLNESS:     Juventino Rueda is a 69 y.o. male patient who returns today for follow up on his pancytopenia and LE DVT. He is on Xarelto 10 mg daily. He is tolerating it without problem with bleeding. He is not noticing blood in the urine. He completed BCG treatment for his bladder cancer and he is currently having cystoscopy done every 6 months.     Patient is taking vitamin B12 and Ferrous Sulfate daily regularly. No upset stomach or constipation from taking the oral iron.     ROS:  Pertinent ROS is in the HPI.     Past medical, surgical, social and family history were reviewed.     MEDICATIONS:    Current Outpatient Medications:     amLODIPine (NORVASC) 5 MG tablet, Take 1 tablet by mouth Daily., Disp: 90 tablet, Rfl: 3    Ascorbic Acid (VITAMIN C PO), Take  by mouth Every Other Day., Disp: , Rfl:     baclofen (LIORESAL) 10 MG tablet, Take 1 tablet by mouth 3 (Three) Times a Day., Disp: , Rfl:     Blood Glucose Monitoring Suppl (ACCU-CHEK LULA PLUS) w/Device kit, 1 kit 4 (Four) Times a Day., Disp: 1 kit, Rfl: 1    busPIRone (BUSPAR) 5 MG tablet, TAKE 1 TABLET BY MOUTH THREE TIMES DAILY, Disp: 90 tablet, Rfl: 2    calcium carbonate (OS-JOE) 600 MG tablet, Take 1 tablet by mouth Daily., Disp: , Rfl:     Cholecalciferol (VITAMIN D3) 3000 units tablet, Take 5,000 Units by mouth. Takes 1000 iu daily , Disp: , Rfl:     Continuous Blood Gluc Sensor ([email or web address] ELIJAH SENSOR SYSTEM) misc, 1 each 3 (Three) Times a Day., Disp: 3 each, Rfl: 12    Diclofenac Sodium (VOLTAREN) 1 % gel gel, APPLY EXTERNALLY TO AFFECTED AREA THREE TIMES DAILY, Disp: , Rfl:     diphenhydrAMINE (BENADRYL) 25 mg capsule, Take 1 capsule by mouth Every 6 (Six) Hours As Needed for Itching., Disp: , Rfl:     ferrous sulfate 325 (65 FE) MG tablet, Take 1 tablet by mouth Daily., Disp: , Rfl:     gabapentin (NEURONTIN) 300 MG  "capsule, Take 1 capsule by mouth 2 (Two) Times a Day., Disp: , Rfl:     glimepiride (AMARYL) 1 MG tablet, TAKE 1 TABLET BY MOUTH EVERY MORNING BEFORE BREAKFAST, Disp: 90 tablet, Rfl: 0    glucose blood (Accu-Chek Erika) test strip, Dx E11.9 check bs daily/accuchek erika plus strips, Disp: 100 each, Rfl: 12    glucose blood test strip, DX E11.9 check bs daily, Disp: 100 each, Rfl: 3    HYDROcodone-acetaminophen (NORCO)  MG per tablet, Take 1 tablet by mouth As Needed., Disp: , Rfl:     Lancets Misc. (ACCU-CHEK FASTCLIX LANCET) kit, 1 kit 4 (Four) Times a Day., Disp: 1 kit, Rfl: 1    NARCAN 4 MG/0.1ML nasal spray, Administer 1 spray into the nostril(s) as directed by provider As Needed. (Patient not taking: Reported on 2/27/2025), Disp: , Rfl:     nexIUM 40 MG capsule, TAKE 1 CAPSULE BY MOUTH DAILY, Disp: 30 capsule, Rfl: 0    rivaroxaban (Xarelto) 10 MG tablet, Take 1 tablet by mouth Daily., Disp: 90 tablet, Rfl: 1    tamsulosin (FLOMAX) 0.4 MG capsule 24 hr capsule, TAKE 1 CAPSULE BY MOUTH EVERY NIGHT, Disp: 90 capsule, Rfl: 1    VENTOLIN  (90 Base) MCG/ACT inhaler, INHALE TWO PUFFS BY MOUTH EVERY 4 HOURS AS NEEDED FOR WHEEZING FOR SHORTNESS OF BREATH, Disp: 1 inhaler, Rfl: 1  Objective     VITAL SIGNS:     Vitals:    02/27/25 1120   BP: 124/75   Pulse: 81   Temp: 97.6 °F (36.4 °C)   TempSrc: Oral   SpO2: 94%   Weight: 99.3 kg (218 lb 14.4 oz)   Height: 188 cm (74.02\")   PainSc: 5    PainLoc: Back     Body mass index is 28.09 kg/m².     Wt Readings from Last 5 Encounters:   02/27/25 99.3 kg (218 lb 14.4 oz)   10/25/24 102 kg (224 lb 9.6 oz)   04/25/24 105 kg (232 lb 1.6 oz)   11/21/23 102 kg (225 lb 8 oz)   10/02/23 97.7 kg (215 lb 6.4 oz)     PHYSICAL EXAMINATION:   GENERAL: The patient appears in fair general condition, not in acute distress.   SKIN: No Ecchymosis.  EYES: No jaundice. Pallor.  CHEST: Normal respiratory effort. Normal breathing sounds bilaterally. No added sounds.  CVS: Normal S1 and " S2. No Murmur.  ABDOMEN: Distended. No tenderness. No Hepatomegaly. No Splenomegaly. No masses.  EXTREMITIES: No joint deformity.     DIAGNOSTIC DATA:     Results from last 7 days   Lab Units 02/27/25  1032   WBC 10*3/mm3 3.13*   NEUTROS ABS 10*3/mm3 1.83   HEMOGLOBIN g/dL 11.5*   HEMATOCRIT % 37.4*   PLATELETS 10*3/mm3 113*         Lab 02/27/25  1032   IRON 92   IRON SATURATION (TSAT) 26   TIBC 358   TRANSFERRIN 240   FERRITIN 121.00      Assessment & Plan    *Leukopenia with neutropenia.   This was identified on labs in 2019.    ANC was 1070 on 3/26/2019.    1/9/2020: Neutrophil count normalized at 2200.    Labs from 9/11/2020 revealed a low normal WBC.  Neutrophils were 1,990.  Labs from 3/8/2021 revealed a WBC count of 3370 with a neutrophil count of 1,890.  On review of the medical records, patient has diagnosis of ESTEBAN.  CT on 9/2/2022 revealed no fatty infiltration of the liver.  Spleen was normal.  8/29/2022: WBC 2150.  Neutrophil count 1580.  6/8/2023: WBC 2680.  Neutrophil count 1490.  Bone marrow biopsy was obtained on 6/21/2023.  It revealed mildly hypocellular bone marrow with 20% cellularity.  No dysplastic changes were seen.  MDS FISH panel revealed no evidence of monosomy/deletion of chromosomes 5, 7, 13, 17 and 20, trisomy 8 or KMT2A.  ?  Secondary to iron and vitamin B-12 deficiency.  6/29/2023: Neutrophil count 2110.  6/29/2023: Copper level normal at 131.  10/2/2023: WBC 2650.  Neutrophil count 1390.  11/21/2023: WBC improved to 3300.  Neutrophils improved to 1820.  WBC decreased to 1300 on 2/23/2024 while at [facility] for pneumonia.  He was given Neupogen during the hospital stay.  4/25/2024: WBC 1960.  Neutrophils 870.  2/27/2025: WBC 3130. Neutrophils 1830.  ? Improvement following adequate vitamin replacement.  He is not having problem with infections.     *Normochromic normocytic anemia.    This was suspected of being anemia of chronic disease.  SPEP [facility]I FLC showed no evidence of a monoclonal  protein.    No evidence of iron B12 or folate deficiency.  He was previously on vitamin B12 daily.    Vitamin B12 increased to >2000 on 11/8/2021.  Vitamin B12 was reduced to once weekly on 11/8/2021.  8/29/2022: Hemoglobin 11.9.  Vital B12 decreased to 443.  Folate was 12.4.  B12 was increased back to once daily.  6/8/2023: Hemoglobin 11.1.   Transferrin saturation decreased to 14%.  6/29/2023: Hemoglobin 11.3.  Bone marrow revealed decreased iron stores.  6/29/2023: Patient was started on ferrous sulfate 325 mg daily.  10/2/2023: Hemoglobin decreased to 10.3.  Ferritin 67.9.  Iron saturation 21%  11/21/2023: Hemoglobin 10.7.  He was continued on ferrous sulfate 325 mg daily and vitamin B12 1000 mcg daily.  4/25/2024: Hemoglobin 10.6.  Ferritin 57.9.  Transferrin saturation 15%.  Based on the persistence of the iron deficiency anemia despite taking the oral iron which is indicative of decreased absorption, IV iron was recommended but the patient decided to hold off.   He continues ferrous sulfate 325 mg daily and vitamin B12 1000 mcg daily.  2/27/2025: Hemoglobin 11.5.  Iron stores adequate with ferritin at 121 and transferrin saturation at 26%.  He is tolerating the oral iron.    *Thrombocytopenia.  This was also attributed to ESTEBAN.  Patient is on chronic anticoagulation with Xarelto.  Platelet count was 107,000 on 8/29/2022.  Since his platelet count was staying >50,000-70,000, he was continued on Xarelto.  6/8/2023: Platelet count 119,000.  11/21/2023: Platelet count stable at 110,000.  CT abdomen and pelvis in on 2/22/2024 at [facility] revealed mild splenomegaly.  The splenomegaly is likely contributing to the leukopenia and neutropenia.  4/25/2024: Platelet count 111,000.  2/27/2025: Platelet count 113,000.  No problem with bleeding.    *Bladder tumor.    Patient had blood in the urine on 8/18/2023.  CT on 8/10/2023 revealed no lymphadenopathy.  There was a mid right ureter stone with hydronephrosis.  Patient was  found on cystoscopy on 9/12/2023 to have a bladder tumor at the right ureteral orifice.  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  S/p TURBT on 10/11/2023.  Exam revealed no residual neoplasm.  Since no tumor was identified on the TURBT specimen, adjuvant systemic therapy was not recommended.  He received BCG treatment.  He is having follow up cystoscopies every 6 months.     *Left lower extremity deep vein thrombosis.  Patient was diagnosed with DVT in the left popliteal vein on 8/28/2018.  No preceding trauma, surgery or hospital stay.  However, he has chronic wounds in the left ankle area (since around 2013).  The wound and the problem with the left ankle limited his movement.  He is on chronic anticoagulation with Xarelto 20 mg daily.  Patient interrupted Xarelto to have the cystoscopy and TURBT procedures.  Venous Doppler on 11/27/2023 revealed chronic DVT in the left popliteal vein.  Xarelto was reduced to 10 mg daily on 11/28/2023.  Bleeding improved after the dose reduction of Xarelto.  He is not having pain or swelling in the legs.     PLAN:    1.  Continue Xarelto 10 mg daily.  2.  Continue ferrous sulfate 325 mg once daily.  3.  Continue vitamin B12 1000 mcg once daily.  4.  Follow-up in 6 months.  We will obtain CBC ferritin iron panel vitamin B12 and folate levels.      Jewell Kendrick MD  02/27/25

## 2025-07-08 RX ORDER — RIVAROXABAN 10 MG/1
10 TABLET, FILM COATED ORAL DAILY
Qty: 90 TABLET | Refills: 1 | Status: SHIPPED | OUTPATIENT
Start: 2025-07-08

## 2025-08-28 ENCOUNTER — LAB (OUTPATIENT)
Dept: LAB | Facility: HOSPITAL | Age: 70
End: 2025-08-28
Payer: MEDICARE

## 2025-08-28 ENCOUNTER — OFFICE VISIT (OUTPATIENT)
Dept: ONCOLOGY | Facility: CLINIC | Age: 70
End: 2025-08-28
Payer: MEDICARE

## 2025-08-28 VITALS
WEIGHT: 204 LBS | HEART RATE: 66 BPM | OXYGEN SATURATION: 90 % | HEIGHT: 74 IN | DIASTOLIC BLOOD PRESSURE: 67 MMHG | BODY MASS INDEX: 26.18 KG/M2 | TEMPERATURE: 97.9 F | SYSTOLIC BLOOD PRESSURE: 148 MMHG

## 2025-08-28 DIAGNOSIS — D50.8 IRON DEFICIENCY ANEMIA SECONDARY TO INADEQUATE DIETARY IRON INTAKE: ICD-10-CM

## 2025-08-28 DIAGNOSIS — D70.9 NEUTROPENIA, UNSPECIFIED TYPE: ICD-10-CM

## 2025-08-28 DIAGNOSIS — C67.9 MALIGNANT NEOPLASM OF URINARY BLADDER, UNSPECIFIED SITE: ICD-10-CM

## 2025-08-28 DIAGNOSIS — D51.3 OTHER DIETARY VITAMIN B12 DEFICIENCY ANEMIA: ICD-10-CM

## 2025-08-28 DIAGNOSIS — I82.532 CHRONIC DEEP VEIN THROMBOSIS (DVT) OF LEFT POPLITEAL VEIN: Primary | ICD-10-CM

## 2025-08-28 LAB
BASOPHILS # BLD AUTO: 0.03 10*3/MM3 (ref 0–0.2)
BASOPHILS NFR BLD AUTO: 0.9 % (ref 0–1.5)
DEPRECATED RDW RBC AUTO: 46.3 FL (ref 37–54)
EOSINOPHIL # BLD AUTO: 0.09 10*3/MM3 (ref 0–0.4)
EOSINOPHIL NFR BLD AUTO: 2.6 % (ref 0.3–6.2)
ERYTHROCYTE [DISTWIDTH] IN BLOOD BY AUTOMATED COUNT: 13.1 % (ref 12.3–15.4)
FERRITIN SERPL-MCNC: 231 NG/ML (ref 30–400)
FOLATE SERPL-MCNC: 15.9 NG/ML (ref 4.78–24.2)
HCT VFR BLD AUTO: 36.7 % (ref 37.5–51)
HGB BLD-MCNC: 11.7 G/DL (ref 13–17.7)
IMM GRANULOCYTES # BLD AUTO: 0.01 10*3/MM3 (ref 0–0.05)
IMM GRANULOCYTES NFR BLD AUTO: 0.3 % (ref 0–0.5)
IRON 24H UR-MRATE: 78 MCG/DL (ref 59–158)
IRON SATN MFR SERPL: 24 % (ref 20–50)
LYMPHOCYTES # BLD AUTO: 1.21 10*3/MM3 (ref 0.7–3.1)
LYMPHOCYTES NFR BLD AUTO: 35.5 % (ref 19.6–45.3)
MCH RBC QN AUTO: 30.7 PG (ref 26.6–33)
MCHC RBC AUTO-ENTMCNC: 31.9 G/DL (ref 31.5–35.7)
MCV RBC AUTO: 96.3 FL (ref 79–97)
MONOCYTES # BLD AUTO: 0.24 10*3/MM3 (ref 0.1–0.9)
MONOCYTES NFR BLD AUTO: 7 % (ref 5–12)
NEUTROPHILS NFR BLD AUTO: 1.83 10*3/MM3 (ref 1.7–7)
NEUTROPHILS NFR BLD AUTO: 53.7 % (ref 42.7–76)
NRBC BLD AUTO-RTO: 0 /100 WBC (ref 0–0.2)
PLATELET # BLD AUTO: 116 10*3/MM3 (ref 140–450)
PMV BLD AUTO: 8.8 FL (ref 6–12)
RBC # BLD AUTO: 3.81 10*6/MM3 (ref 4.14–5.8)
TIBC SERPL-MCNC: 323 MCG/DL (ref 298–536)
TRANSFERRIN SERPL-MCNC: 217 MG/DL (ref 200–360)
VIT B12 BLD-MCNC: 972 PG/ML (ref 211–946)
WBC NRBC COR # BLD AUTO: 3.41 10*3/MM3 (ref 3.4–10.8)

## 2025-08-28 PROCEDURE — 85025 COMPLETE CBC W/AUTO DIFF WBC: CPT

## 2025-08-28 PROCEDURE — 36415 COLL VENOUS BLD VENIPUNCTURE: CPT

## 2025-08-28 PROCEDURE — 84466 ASSAY OF TRANSFERRIN: CPT

## 2025-08-28 PROCEDURE — 83540 ASSAY OF IRON: CPT

## 2025-08-28 PROCEDURE — 82607 VITAMIN B-12: CPT | Performed by: INTERNAL MEDICINE

## 2025-08-28 PROCEDURE — 82728 ASSAY OF FERRITIN: CPT

## 2025-08-28 PROCEDURE — 82746 ASSAY OF FOLIC ACID SERUM: CPT | Performed by: INTERNAL MEDICINE

## 2025-08-28 RX ORDER — LANOLIN ALCOHOL/MO/W.PET/CERES
1000 CREAM (GRAM) TOPICAL DAILY
Start: 2025-08-28